# Patient Record
Sex: MALE | Race: WHITE | NOT HISPANIC OR LATINO | Employment: OTHER | URBAN - METROPOLITAN AREA
[De-identification: names, ages, dates, MRNs, and addresses within clinical notes are randomized per-mention and may not be internally consistent; named-entity substitution may affect disease eponyms.]

---

## 2017-01-11 ENCOUNTER — OFFICE VISIT (OUTPATIENT)
Dept: URGENT CARE | Facility: PHYSICIAN GROUP | Age: 77
End: 2017-01-11
Payer: MEDICARE

## 2017-01-11 VITALS
DIASTOLIC BLOOD PRESSURE: 82 MMHG | RESPIRATION RATE: 20 BRPM | TEMPERATURE: 97.4 F | BODY MASS INDEX: 25.07 KG/M2 | SYSTOLIC BLOOD PRESSURE: 116 MMHG | WEIGHT: 190 LBS | OXYGEN SATURATION: 94 % | HEART RATE: 67 BPM

## 2017-01-11 DIAGNOSIS — J20.9 ACUTE BRONCHITIS, UNSPECIFIED ORGANISM: ICD-10-CM

## 2017-01-11 PROCEDURE — 99214 OFFICE O/P EST MOD 30 MIN: CPT | Performed by: NURSE PRACTITIONER

## 2017-01-11 RX ORDER — M-VIT,TX,IRON,MINS/CALC/FOLIC 27MG-0.4MG
1 TABLET ORAL DAILY
COMMUNITY

## 2017-01-11 RX ORDER — DOXYCYCLINE HYCLATE 100 MG
100 TABLET ORAL 2 TIMES DAILY
Qty: 14 TAB | Refills: 0 | Status: SHIPPED | OUTPATIENT
Start: 2017-01-11 | End: 2017-01-17

## 2017-01-11 ASSESSMENT — ENCOUNTER SYMPTOMS
CHILLS: 0
SPUTUM PRODUCTION: 1
NAUSEA: 0
SHORTNESS OF BREATH: 0
DIARRHEA: 0
MYALGIAS: 0
FEVER: 0
VOMITING: 0
SORE THROAT: 0
COUGH: 1
WEAKNESS: 1
RHINORRHEA: 1

## 2017-01-11 ASSESSMENT — COPD QUESTIONNAIRES: COPD: 0

## 2017-01-11 NOTE — PATIENT INSTRUCTIONS
Acute Bronchitis  Bronchitis is inflammation of the airways that extend from the windpipe into the lungs (bronchi). The inflammation often causes mucus to develop. This leads to a cough, which is the most common symptom of bronchitis.   In acute bronchitis, the condition usually develops suddenly and goes away over time, usually in a couple weeks. Smoking, allergies, and asthma can make bronchitis worse. Repeated episodes of bronchitis may cause further lung problems.   CAUSES  Acute bronchitis is most often caused by the same virus that causes a cold. The virus can spread from person to person (contagious) through coughing, sneezing, and touching contaminated objects.  SIGNS AND SYMPTOMS   · Cough.    · Fever.    · Coughing up mucus.    · Body aches.    · Chest congestion.    · Chills.    · Shortness of breath.    · Sore throat.    DIAGNOSIS   Acute bronchitis is usually diagnosed through a physical exam. Your health care provider will also ask you questions about your medical history. Tests, such as chest X-rays, are sometimes done to rule out other conditions.   TREATMENT   Acute bronchitis usually goes away in a couple weeks. Oftentimes, no medical treatment is necessary. Medicines are sometimes given for relief of fever or cough. Antibiotic medicines are usually not needed but may be prescribed in certain situations. In some cases, an inhaler may be recommended to help reduce shortness of breath and control the cough. A cool mist vaporizer may also be used to help thin bronchial secretions and make it easier to clear the chest.   HOME CARE INSTRUCTIONS  · Get plenty of rest.    · Drink enough fluids to keep your urine clear or pale yellow (unless you have a medical condition that requires fluid restriction). Increasing fluids may help thin your respiratory secretions (sputum) and reduce chest congestion, and it will prevent dehydration.    · Take medicines only as directed by your health care provider.  · If  you were prescribed an antibiotic medicine, finish it all even if you start to feel better.  · Avoid smoking and secondhand smoke. Exposure to cigarette smoke or irritating chemicals will make bronchitis worse. If you are a smoker, consider using nicotine gum or skin patches to help control withdrawal symptoms. Quitting smoking will help your lungs heal faster.    · Reduce the chances of another bout of acute bronchitis by washing your hands frequently, avoiding people with cold symptoms, and trying not to touch your hands to your mouth, nose, or eyes.    · Keep all follow-up visits as directed by your health care provider.    SEEK MEDICAL CARE IF:  Your symptoms do not improve after 1 week of treatment.   SEEK IMMEDIATE MEDICAL CARE IF:  · You develop an increased fever or chills.    · You have chest pain.    · You have severe shortness of breath.  · You have bloody sputum.    · You develop dehydration.  · You faint or repeatedly feel like you are going to pass out.  · You develop repeated vomiting.  · You develop a severe headache.  MAKE SURE YOU:   · Understand these instructions.  · Will watch your condition.  · Will get help right away if you are not doing well or get worse.     This information is not intended to replace advice given to you by your health care provider. Make sure you discuss any questions you have with your health care provider.     Document Released: 01/25/2006 Document Revised: 01/08/2016 Document Reviewed: 06/10/2014  [employer] Interactive Patient Education ©2016 [employer]

## 2017-01-11 NOTE — PROGRESS NOTES
Subjective:      Isaiah Coronel is a 77 y.o. male who presents with Cough            HPI Comments: Medications, Allergies and Prior Medical Hx reviewed and updated in [facility].with patient/family today         Cough  This is a new problem. The current episode started in the past 7 days. The problem has been gradually worsening. The cough is productive of sputum. Associated symptoms include rhinorrhea. Pertinent negatives include no chills, ear congestion, ear pain, fever, myalgias, nasal congestion, sore throat or shortness of breath. He has tried nothing for the symptoms. His past medical history is significant for pneumonia. There is no history of asthma, COPD or emphysema.       Review of Systems   Constitutional: Positive for malaise/fatigue. Negative for fever and chills.   HENT: Positive for rhinorrhea. Negative for congestion, ear discharge, ear pain and sore throat.    Respiratory: Positive for cough and sputum production. Negative for shortness of breath.    Gastrointestinal: Negative for nausea, vomiting and diarrhea.   Musculoskeletal: Negative for myalgias.   Neurological: Positive for weakness.          Objective:     /82 mmHg  Pulse 67  Temp(Src) 36.3 °C (97.4 °F)  Resp 20  Wt 86.183 kg (190 lb)  SpO2 94%     Physical Exam   Constitutional: He appears well-developed and well-nourished. No distress.   HENT:   Head: Normocephalic and atraumatic.   Right Ear: Tympanic membrane and ear canal normal.   Left Ear: Tympanic membrane and ear canal normal.   Nose: No rhinorrhea.   Mouth/Throat: Uvula is midline and mucous membranes are normal. Posterior oropharyngeal erythema present. No oropharyngeal exudate or posterior oropharyngeal edema.   Eyes: Conjunctivae are normal. Pupils are equal, round, and reactive to light.   Neck: Neck supple.   Cardiovascular: Normal rate, regular rhythm and normal heart sounds.    Pulmonary/Chest: Effort normal and breath sounds normal. No respiratory distress. He has  no wheezes.   Lymphadenopathy:     He has no cervical adenopathy.   Neurological: He is alert.   Skin: Skin is warm and dry.   Psychiatric: He has a normal mood and affect. His behavior is normal.   Vitals reviewed.              Assessment/Plan:       1. Acute bronchitis, unspecified organism  doxycycline (VIBRAMYCIN) 100 MG Tab     Call coumadin clinic about doxycyclin in 1-2 days    Epic generated written imformation provided along with verbal instructions for bronchitis    Rest, Fluids, tylenol, ibuprofen, humidified air, and otc cough meds  Pt will go to the ER for worsening or changing symptoms as discussed,  Follow-up with your primary care provider or return here if not improving in 7 days   Discharge instructions discussed with pt/family who verbalize understanding and agreement with poc

## 2017-01-12 ENCOUNTER — APPOINTMENT (OUTPATIENT)
Dept: ADMISSIONS | Facility: MEDICAL CENTER | Age: 77
End: 2017-01-12
Payer: MEDICARE

## 2017-01-17 ENCOUNTER — HOSPITAL ENCOUNTER (OUTPATIENT)
Dept: RADIOLOGY | Facility: MEDICAL CENTER | Age: 77
End: 2017-01-17
Attending: INTERNAL MEDICINE | Admitting: INTERNAL MEDICINE
Payer: MEDICARE

## 2017-01-17 DIAGNOSIS — Z01.810 PRE-OPERATIVE CARDIOVASCULAR EXAMINATION: ICD-10-CM

## 2017-01-17 DIAGNOSIS — Z01.811 PRE-OPERATIVE RESPIRATORY EXAMINATION: ICD-10-CM

## 2017-01-17 DIAGNOSIS — Z01.812 PRE-OPERATIVE LABORATORY EXAMINATION: ICD-10-CM

## 2017-01-17 LAB
ANION GAP SERPL CALC-SCNC: 10 MMOL/L (ref 0–11.9)
APTT PPP: 37.6 SEC (ref 24.7–36)
BASOPHILS # BLD AUTO: 0.4 % (ref 0–1.8)
BASOPHILS # BLD: 0.03 K/UL (ref 0–0.12)
BUN SERPL-MCNC: 23 MG/DL (ref 8–22)
CALCIUM SERPL-MCNC: 9 MG/DL (ref 8.4–10.2)
CHLORIDE SERPL-SCNC: 101 MMOL/L (ref 96–112)
CO2 SERPL-SCNC: 26 MMOL/L (ref 20–33)
CREAT SERPL-MCNC: 0.96 MG/DL (ref 0.5–1.4)
EKG IMPRESSION: NORMAL
EOSINOPHIL # BLD AUTO: 0.09 K/UL (ref 0–0.51)
EOSINOPHIL NFR BLD: 1.3 % (ref 0–6.9)
ERYTHROCYTE [DISTWIDTH] IN BLOOD BY AUTOMATED COUNT: 46.5 FL (ref 35.9–50)
GFR SERPL CREATININE-BSD FRML MDRD: >60 ML/MIN/1.73 M 2
GLUCOSE SERPL-MCNC: 112 MG/DL (ref 65–99)
HCT VFR BLD AUTO: 47.7 % (ref 42–52)
HGB BLD-MCNC: 16.2 G/DL (ref 14–18)
IMM GRANULOCYTES # BLD AUTO: 0.02 K/UL (ref 0–0.11)
IMM GRANULOCYTES NFR BLD AUTO: 0.3 % (ref 0–0.9)
INR PPP: 2.9 (ref 0.87–1.13)
LYMPHOCYTES # BLD AUTO: 1.42 K/UL (ref 1–4.8)
LYMPHOCYTES NFR BLD: 19.9 % (ref 22–41)
MCH RBC QN AUTO: 32.9 PG (ref 27–33)
MCHC RBC AUTO-ENTMCNC: 34 G/DL (ref 33.7–35.3)
MCV RBC AUTO: 97 FL (ref 81.4–97.8)
MONOCYTES # BLD AUTO: 0.56 K/UL (ref 0–0.85)
MONOCYTES NFR BLD AUTO: 7.9 % (ref 0–13.4)
NEUTROPHILS # BLD AUTO: 5.01 K/UL (ref 1.82–7.42)
NEUTROPHILS NFR BLD: 70.2 % (ref 44–72)
NRBC # BLD AUTO: 0 K/UL
NRBC BLD AUTO-RTO: 0 /100 WBC
PLATELET # BLD AUTO: 174 K/UL (ref 164–446)
PMV BLD AUTO: 10.4 FL (ref 9–12.9)
POTASSIUM SERPL-SCNC: 4.1 MMOL/L (ref 3.6–5.5)
PROTHROMBIN TIME: 30.1 SEC (ref 12–14.6)
RBC # BLD AUTO: 4.92 M/UL (ref 4.7–6.1)
SODIUM SERPL-SCNC: 137 MMOL/L (ref 135–145)
WBC # BLD AUTO: 7.1 K/UL (ref 4.8–10.8)

## 2017-01-17 PROCEDURE — 85610 PROTHROMBIN TIME: CPT

## 2017-01-17 PROCEDURE — 80048 BASIC METABOLIC PNL TOTAL CA: CPT

## 2017-01-17 PROCEDURE — 71010 DX-CHEST-LIMITED (1 VIEW): CPT

## 2017-01-17 PROCEDURE — 85730 THROMBOPLASTIN TIME PARTIAL: CPT

## 2017-01-17 PROCEDURE — 85025 COMPLETE CBC W/AUTO DIFF WBC: CPT

## 2017-01-17 PROCEDURE — 36415 COLL VENOUS BLD VENIPUNCTURE: CPT

## 2017-01-17 NOTE — OR NURSING
Pre admit appt: Pt instructed to continue regularly prescribed medications through day before surgery.Per anesthesia protocol pt instructed to take these medications with a sip of water the day of surgery- metoprolol, finasteride and prilosec. Pt going this week to coumadin clinic and will find out when to stop coumadin and if bridging ,also when to stop ASA. Aware to stop vitamins/supplements now.

## 2017-01-18 ENCOUNTER — ANTICOAGULATION VISIT (OUTPATIENT)
Dept: VASCULAR LAB | Facility: MEDICAL CENTER | Age: 77
End: 2017-01-18
Attending: NURSE PRACTITIONER
Payer: MEDICARE

## 2017-01-18 DIAGNOSIS — I48.20 CHRONIC ATRIAL FIBRILLATION (HCC): ICD-10-CM

## 2017-01-18 LAB
INR BLD: 3.2 (ref 0.9–1.2)
INR PPP: 3.2 (ref 2–3.5)

## 2017-01-18 PROCEDURE — 99212 OFFICE O/P EST SF 10 MIN: CPT

## 2017-01-18 PROCEDURE — 85610 PROTHROMBIN TIME: CPT

## 2017-01-18 NOTE — MR AVS SNAPSHOT
"        Isaiah Coronel   2017 9:15 AM   Anticoagulation Visit   MRN: [id number]    Department:  Vascular Medicine   Dept Phone:  [phone]    Description:  Male : 1940   Provider:  [facility] EXAM 1           Allergies as of 2017     Allergen Noted Reactions    Other Environmental 2012       \"My nose and eyes are bothered when I walk outside.\"      You were diagnosed with     Chronic atrial fibrillation (CMS-HCC)   [408360]         Vital Signs     Smoking Status                   Former Smoker           Basic Information     Date Of Birth Sex Race Ethnicity Preferred Language    1940 Male White Non- English      Your appointments     2017  9:15 AM   Established Patient with [facility] EXAM 1   [facility]  (--)    [address] [place] [address]   [phone]            2017  9:15 AM   Established Patient with [facility] EXAM 5   [facility]  (--)    [address] [place] [address]   [phone]            2017  9:30 AM   Anti-Coag Routine with [place] PHARMACIST   [facility]    [address] [place] [id number] [phone]            Mar 20, 2017 10:00 AM   ANNUAL EXAM PREVENTATIVE with OSCAR Hutchinson   [facility])    [address] [address]   [phone]            2017 10:40 AM   Established Patient with Avery Nelson M.D.   [facility] [address] [place] [id number] [phone]           You will be receiving a confirmation call a few days before your appointment from our automated call confirmation system.              Problem List              ICD-10-CM Priority Class Noted - Resolved    Essential hypertension I10   2011 - Present    COPD (chronic obstructive pulmonary disease) " (CMS-HCC) J44.9   3/9/2012 - Present    S/P colonoscopy 2013; NIA 5 YR- at [facility] Z98.890   3/9/2012 - Present    Left ventricular systolic dysfunction-EF=45-50% ECHO 2012- normal cardiac cath 2014- dr khan at [facility] I51.9   6/19/2012 - Present    Mixed hyperlipidemia E78.2   6/19/2012 - Present    Vitamin D deficiency disease E55.9   12/2/2013 - Present    Achalasia of esophagus- fundoplication at [facility] 2014- [facility] K22.0   2/26/2014 - Present    Gastroesophageal reflux disease with esophagitis- fundoplication at [facility] 2014 K21.0   4/9/2015 - Present    BPH (benign prostatic hyperplasia)- neg cysto 2014 N40.0   4/9/2015 - Present    Atrial fibrillation (HCC)- dr khan I48.91   10/14/2015 - Present    Anticoagulated on warfarin- a fib, dr rhodes Z79.01   11/24/2015 - Present    Right hip pain- dr hughes M25.551   11/24/2015 - Present    Chronic low back pain- dr hughes M54.5, G89.29   5/27/2016 - Present    Moderate episode of recurrent major depressive disorder (CMS-HCC) F33.1   12/1/2016 - Present      Health Maintenance        Date Due Completion Dates    IMM DTaP/Tdap/Td Vaccine (1 - Tdap) 1/11/1960 ---    IMM ZOSTER VACCINE 1/11/2001 ---    IMM PNEUMOCOCCAL 65+ (ADULT) LOW/MEDIUM RISK SERIES (2 of 2 - PPSV23) 1/14/2016 1/14/2015    COLONOSCOPY 1/29/2018 1/29/2013            Results     POCT Protime      Component    INR    3.2                        Current Immunizations     13-VALENT PCV PREVNAR 1/14/2015    Influenza TIV (IM) 9/1/2012    Influenza Vaccine 9/1/2011, 10/1/2010    Influenza Vaccine Adult HD 9/15/2016    Influenza Vaccine Quad Inj (Preserved) 10/17/2014    Pneumococcal Vaccine (UF)Historical Data 10/1/2010      Below and/or attached are the medications your provider expects you to take. Review all of your home medications and newly ordered medications with your provider and/or pharmacist. Follow medication instructions as directed by your provider and/or pharmacist. Please keep your medication list with  you and share with your provider. Update the information when medications are discontinued, doses are changed, or new medications (including over-the-counter products) are added; and carry medication information at all times in the event of emergency situations     Allergies:  OTHER ENVIRONMENTAL - (reactions not documented)               Medications  Valid as of: January 18, 2017 -  8:30 AM    Generic Name Brand Name Tablet Size Instructions for use    Aspirin (Chew Tab) ASA 81 MG Take 1 Tab by mouth every day.        Cholecalciferol (Cap) Vitamin D 2000 UNITS Take  by mouth.        Citalopram Hydrobromide (Tab) CELEXA 10 MG Take 1 Tab by mouth every day.        Cyanocobalamin (Tab) VITAMIN B-12 500 MCG Take 500 mcg by mouth 2 Times a Day.        Finasteride (Tab) PROSCAR 5 MG Take 1 Tab by mouth every day.        Lisinopril (Tab) PRINIVIL 20 MG Take 1 tablet by mouth  every day        Metoprolol Succinate (TABLET SR 24 HR) TOPROL XL 50 MG Take 1 Tab by mouth every day. Dr khan to refill        Multiple Vitamins-Minerals (Tab) THERAGRAN-M  Take 1 Tab by mouth every day.        Omega-3 Fatty Acids (Cap) OMEGA 3 FA 1000 MG Take 1 Cap by mouth 3 times a day, with meals.        Omeprazole (CAPSULE DELAYED RELEASE) PRILOSEC 20 MG Take 1 capsule by mouth  twice daily        Pravastatin Sodium (Tab) PRAVACHOL 40 MG Take 1 tablet by mouth  daily        Warfarin Sodium (Tab) COUMADIN 5 MG TAKE ONE AND ONE-HALF  TABLETS DAILY AS DIRECTED  BY COUMADIN CLINIC        .                 Medicines prescribed today were sent to:     [facility] - [address] [facility] [address]    Phone: [phone] Fax: [phone]    Open 24 Hours?: No    [facility] - [place] - [address]    [address] [place] [address]    Phone: [phone] Fax: [phone]    Open 24 Hours?: No    [id number] - [place] - [address]    [address]  [place] [address]    Phone: [phone] Fax: [phone]    Open 24 Hours?: No      Medication refill instructions:       If your prescription bottle indicates you have medication refills left, it is not necessary to call your provider’s office. Please contact your pharmacy and they will refill your medication.    If your prescription bottle indicates you do not have any refills left, you may request refills at any time through one of the following ways: The online [employer] system (except Urgent Care), by calling your provider’s office, or by asking your pharmacy to contact your provider’s office with a refill request. Medication refills are processed only during regular business hours and may not be available until the next business day. Your provider may request additional information or to have a follow-up visit with you prior to refilling your medication.   *Please Note: Medication refills are assigned a new Rx number when refilled electronically. Your pharmacy may indicate that no refills were authorized even though a new prescription for the same medication is available at the pharmacy. Please request the medicine by name with the pharmacy before contacting your provider for a refill.        Warfarin Dosing Calendar   January 2017 Details    Sun Mon Tue Wed Thu Fri Sat     1               2               3               4               5               6               7                 8               9               10               11               12               13               14                 15               16               17               18   3.2   5 mg   See details      19      5 mg         20      Hold         21      Hold           22      Hold         23      Hold         24      Hold         25      7.5 mg         26      5 mg         27      7.5 mg         28      7.5 mg           29      7.5 mg         30      7.5 mg         31      5 mg              Date Details   01/18 This INR check    INR: 3.2      Date of next INR: No date specified         How to take your warfarin dose     To take:  5 mg Take 1 of the 5 mg tablets.    To take:  7.5 mg Take 1.5 of the 5 mg tablets.    Hold Do not take your warfarin dose. See the Details table to the right for additional instructions.                   MyChart Status: Patient Declined

## 2017-01-19 NOTE — OR NURSING
Spoke to Jessica Carreon's office regarding abnormal EKG, showing previous infart.  She is contacting patient's cardiologist, to get a cardiac clearance.  She will follow up Monday 1/23/17 to see if patient is cleared.  If not, Dr. Carreon's office will cxl until get a clearance

## 2017-01-24 ENCOUNTER — ANTICOAGULATION VISIT (OUTPATIENT)
Dept: VASCULAR LAB | Facility: MEDICAL CENTER | Age: 77
End: 2017-01-24
Attending: NURSE PRACTITIONER
Payer: MEDICARE

## 2017-01-24 ENCOUNTER — TELEPHONE (OUTPATIENT)
Dept: VASCULAR LAB | Facility: MEDICAL CENTER | Age: 77
End: 2017-01-24

## 2017-01-24 DIAGNOSIS — I48.20 CHRONIC ATRIAL FIBRILLATION (HCC): ICD-10-CM

## 2017-01-24 LAB — INR PPP: 1.3 (ref 2–3.5)

## 2017-01-24 PROCEDURE — 99211 OFF/OP EST MAY X REQ PHY/QHP: CPT | Performed by: NURSE PRACTITIONER

## 2017-01-24 PROCEDURE — 85610 PROTHROMBIN TIME: CPT

## 2017-01-24 NOTE — TELEPHONE ENCOUNTER
INR report faxed to Dr[facility] at [phone]    MARIA DOLORES Garcia  [facility]

## 2017-01-24 NOTE — MR AVS SNAPSHOT
"        Isaiah Coronel   2017 9:15 AM   Anticoagulation Visit   MRN: [id number]    Department:  Vascular Medicine   Dept Phone:  [phone]    Description:  Male : 1940   Provider:  [facility] EXAM 5           Allergies as of 2017     Allergen Noted Reactions    Other Environmental 2012       \"My nose and eyes are bothered when I walk outside.\"      You were diagnosed with     Chronic atrial fibrillation (CMS-HCC)   [172014]         Vital Signs     Smoking Status                   Former Smoker           Basic Information     Date Of Birth Sex Race Ethnicity Preferred Language    1940 Male White Non- English      Your appointments     2017  9:15 AM   Established Patient with [facility] EXAM 5   [facility]  (--)    [address] [address]   [phone]            2017  9:00 AM   Anti-Coag Routine with [place] PHARMACIST   [facility])    [address] [place] [phone]            Mar 20, 2017 10:00 AM   ANNUAL EXAM PREVENTATIVE with OSCAR Hutchinson   [facility])    [address] [place] [phone]            2017 10:40 AM   Established Patient with Avery Nelson M.D.   [address]    [address] [facility] [address] [phone]           You will be receiving a confirmation call a few days before your appointment from our automated call confirmation system.              Problem List              ICD-10-CM Priority Class Noted - Resolved    Essential hypertension I10   2011 - Present    COPD (chronic obstructive pulmonary disease) (CMS-HCC) J44.9   3/9/2012 - Present    S/P colonoscopy ; NIA 5 YR- at [facility] Z98.890   3/9/2012 - Present    Left ventricular systolic dysfunction-EF=45-50% ECHO - normal cardiac cath - dr" erin at [facility] I51.9   6/19/2012 - Present    Mixed hyperlipidemia E78.2   6/19/2012 - Present    Vitamin D deficiency disease E55.9   12/2/2013 - Present    Achalasia of esophagus- fundoplication at [facility] 2014- [facility] K22.0   2/26/2014 - Present    Gastroesophageal reflux disease with esophagitis- fundoplication at [facility] 2014 K21.0   4/9/2015 - Present    BPH (benign prostatic hyperplasia)- neg cysto 2014 N40.0   4/9/2015 - Present    Atrial fibrillation (HCC)- dr khan I48.91   10/14/2015 - Present    Anticoagulated on warfarin- a fib, dr rhodes Z79.01   11/24/2015 - Present    Right hip pain- dr hughes M25.551   11/24/2015 - Present    Chronic low back pain- dr hughes M54.5, G89.29   5/27/2016 - Present    Moderate episode of recurrent major depressive disorder (CMS-HCC) F33.1   12/1/2016 - Present      Health Maintenance        Date Due Completion Dates    IMM DTaP/Tdap/Td Vaccine (1 - Tdap) 1/11/1960 ---    IMM ZOSTER VACCINE 1/11/2001 ---    IMM PNEUMOCOCCAL 65+ (ADULT) LOW/MEDIUM RISK SERIES (2 of 2 - PPSV23) 1/14/2016 1/14/2015    COLONOSCOPY 8/31/2020 8/31/2015, 1/29/2013            Results     POCT Protime      Component    INR    1.3                        Current Immunizations     13-VALENT PCV PREVNAR 1/14/2015    Influenza TIV (IM) 9/1/2012    Influenza Vaccine 9/1/2011, 10/1/2010    Influenza Vaccine Adult HD 9/15/2016    Influenza Vaccine Quad Inj (Preserved) 10/17/2014    Pneumococcal Vaccine (UF)Historical Data 10/1/2010      Below and/or attached are the medications your provider expects you to take. Review all of your home medications and newly ordered medications with your provider and/or pharmacist. Follow medication instructions as directed by your provider and/or pharmacist. Please keep your medication list with you and share with your provider. Update the information when medications are discontinued, doses are changed, or new medications (including over-the-counter products) are added; and carry  medication information at all times in the event of emergency situations     Allergies:  OTHER ENVIRONMENTAL - (reactions not documented)               Medications  Valid as of: January 24, 2017 -  8:28 AM    Generic Name Brand Name Tablet Size Instructions for use    Aspirin (Chew Tab) ASA 81 MG Take 1 Tab by mouth every day.        Cholecalciferol (Cap) Vitamin D 2000 UNITS Take  by mouth.        Citalopram Hydrobromide (Tab) CELEXA 10 MG Take 1 Tab by mouth every day.        Cyanocobalamin (Tab) VITAMIN B-12 500 MCG Take 500 mcg by mouth 2 Times a Day.        Finasteride (Tab) PROSCAR 5 MG Take 1 Tab by mouth every day.        Lisinopril (Tab) PRINIVIL 20 MG Take 1 tablet by mouth  every day        Metoprolol Succinate (TABLET SR 24 HR) TOPROL XL 50 MG Take 1 Tab by mouth every day. Dr khan to refill        Multiple Vitamins-Minerals (Tab) THERAGRAN-M  Take 1 Tab by mouth every day.        Omega-3 Fatty Acids (Cap) OMEGA 3 FA 1000 MG Take 1 Cap by mouth 3 times a day, with meals.        Omeprazole (CAPSULE DELAYED RELEASE) PRILOSEC 20 MG Take 1 capsule by mouth  twice daily        Pravastatin Sodium (Tab) PRAVACHOL 40 MG Take 1 tablet by mouth  daily        Warfarin Sodium (Tab) COUMADIN 5 MG TAKE ONE AND ONE-HALF  TABLETS DAILY AS DIRECTED  BY COUMADIN CLINIC        .                 Medicines prescribed today were sent to:     [facility] - [address] [facility] [address]    Phone: [phone] Fax: [phone]    Open 24 Hours?: No    [facility] [place] - [address]    [facility] [facility] [place] [address]    Phone: [phone] Fax: [phone]    Open 24 Hours?: No    [id number] - [place] - [address]    [address] [place] [address]    Phone: [phone] Fax: [phone]    Open 24 Hours?: No      Medication refill instructions:       If your prescription bottle indicates you have medication  refills left, it is not necessary to call your provider’s office. Please contact your pharmacy and they will refill your medication.    If your prescription bottle indicates you do not have any refills left, you may request refills at any time through one of the following ways: The online [employer] system (except Urgent Care), by calling your provider’s office, or by asking your pharmacy to contact your provider’s office with a refill request. Medication refills are processed only during regular business hours and may not be available until the next business day. Your provider may request additional information or to have a follow-up visit with you prior to refilling your medication.   *Please Note: Medication refills are assigned a new Rx number when refilled electronically. Your pharmacy may indicate that no refills were authorized even though a new prescription for the same medication is available at the pharmacy. Please request the medicine by name with the pharmacy before contacting your provider for a refill.        Warfarin Dosing Calendar   January 2017 Details    Sun Mon Tue Wed Thu Fri Sat     1               2               3               4               5               6               7                 8               9               10               11               12               13               14                 15               16               17               18               19               20               21                 22               23               24   1.3   Hold   See details      25      7.5 mg         26      5 mg         27      7.5 mg         28      7.5 mg           29      7.5 mg         30      7.5 mg         31                    Date Details   01/24 This INR check   INR: 1.3       Date of next INR:  1/30/2017         How to take your warfarin dose     To take:  5 mg Take 1 of the 5 mg tablets.    To take:  7.5 mg Take 1.5 of the 5 mg tablets.    Hold Do not take your  warfarin dose. See the Details table to the right for additional instructions.                   MyChart Status: Patient Declined

## 2017-01-24 NOTE — PROGRESS NOTES
OP Anticoagulation Service Note    Date: 1/24/2017       Plan:  Pt is subtherapeutic, as he is holding his warfarin for an upcoming procedure.  Follow up appointment in 1 week(s).  He anticipates restarting warfarin 1/25, the night of his procedure.      Anticoagulation Summary as of 1/24/2017     INR goal 2.0-3.0   Selected INR 1.3! (1/24/2017)   Maintenance plan 5 mg (5 mg x 1) on Tue, Thu; 7.5 mg (5 mg x 1.5) all other days   Weekly total 47.5 mg   Plan last modified OSCAR Villalta (6/23/2016)   Next INR check 1/30/2017   Target end date Indefinite    Indications   A-fib- chronic warfarin rx (Resolved) [I48.91]  Atrial fibrillation (HCC)- dr khan [I48.91]         Anticoagulation Episode Summary     INR check location [facility]    Preferred lab     Send INR reminders to     Comments       Anticoagulation Care Providers     Provider Role Specialty Phone number    Duncan Tierney M.D. Referring Family Medicine [phone]    Alonso Khan M.D.  Cardiology [phone]    Mikhail Camacho, PHARMD Responsible            [facility]  [facility]

## 2017-01-25 ENCOUNTER — APPOINTMENT (OUTPATIENT)
Dept: RADIOLOGY | Facility: MEDICAL CENTER | Age: 77
End: 2017-01-25
Attending: INTERNAL MEDICINE
Payer: MEDICARE

## 2017-01-25 ENCOUNTER — HOSPITAL ENCOUNTER (OUTPATIENT)
Facility: MEDICAL CENTER | Age: 77
End: 2017-01-25
Attending: INTERNAL MEDICINE | Admitting: INTERNAL MEDICINE
Payer: MEDICARE

## 2017-01-25 VITALS
WEIGHT: 190.17 LBS | RESPIRATION RATE: 16 BRPM | BODY MASS INDEX: 24.41 KG/M2 | HEART RATE: 59 BPM | DIASTOLIC BLOOD PRESSURE: 101 MMHG | OXYGEN SATURATION: 92 % | HEIGHT: 74 IN | TEMPERATURE: 97.9 F | SYSTOLIC BLOOD PRESSURE: 160 MMHG

## 2017-01-25 PROBLEM — K80.51 CHOLEDOCHOLITHIASIS WITH OBSTRUCTION: Status: ACTIVE | Noted: 2017-01-25

## 2017-01-25 PROBLEM — R10.9 ABDOMINAL PAIN: Status: ACTIVE | Noted: 2017-01-25

## 2017-01-25 PROBLEM — R93.2 BILIARY TRACT IMAGING ABNORMALITY: Status: ACTIVE | Noted: 2017-01-25

## 2017-01-25 LAB
INR BLD: 1.3 (ref 0.9–1.2)
INR PPP: 1.05 (ref 0.87–1.13)
PATHOLOGY CONSULT NOTE: NORMAL
PROTHROMBIN TIME: 13.5 SEC (ref 12–14.6)

## 2017-01-25 PROCEDURE — 700111 HCHG RX REV CODE 636 W/ 250 OVERRIDE (IP)

## 2017-01-25 PROCEDURE — A9270 NON-COVERED ITEM OR SERVICE: HCPCS

## 2017-01-25 PROCEDURE — 88312 SPECIAL STAINS GROUP 1: CPT

## 2017-01-25 PROCEDURE — 88305 TISSUE EXAM BY PATHOLOGIST: CPT

## 2017-01-25 PROCEDURE — 160046 HCHG PACU - 1ST 60 MINS PHASE II: Performed by: INTERNAL MEDICINE

## 2017-01-25 PROCEDURE — 160036 HCHG PACU - EA ADDL 30 MINS PHASE I: Performed by: INTERNAL MEDICINE

## 2017-01-25 PROCEDURE — 700102 HCHG RX REV CODE 250 W/ 637 OVERRIDE(OP)

## 2017-01-25 PROCEDURE — 700101 HCHG RX REV CODE 250

## 2017-01-25 PROCEDURE — 160208 HCHG ENDO MINUTES - EA ADDL 1 MIN LEVEL 4: Performed by: INTERNAL MEDICINE

## 2017-01-25 PROCEDURE — 160009 HCHG ANES TIME/MIN: Performed by: INTERNAL MEDICINE

## 2017-01-25 PROCEDURE — 74328 X-RAY BILE DUCT ENDOSCOPY: CPT

## 2017-01-25 PROCEDURE — 160025 RECOVERY II MINUTES (STATS): Performed by: INTERNAL MEDICINE

## 2017-01-25 PROCEDURE — 500066 HCHG BITE BLOCK, ECT: Performed by: INTERNAL MEDICINE

## 2017-01-25 PROCEDURE — 160048 HCHG OR STATISTICAL LEVEL 1-5: Performed by: INTERNAL MEDICINE

## 2017-01-25 PROCEDURE — 160002 HCHG RECOVERY MINUTES (STAT): Performed by: INTERNAL MEDICINE

## 2017-01-25 PROCEDURE — 502240 HCHG MISC OR SUPPLY RC 0272: Performed by: INTERNAL MEDICINE

## 2017-01-25 PROCEDURE — 110371 HCHG SHELL REV 272: Performed by: INTERNAL MEDICINE

## 2017-01-25 PROCEDURE — 160035 HCHG PACU - 1ST 60 MINS PHASE I: Performed by: INTERNAL MEDICINE

## 2017-01-25 PROCEDURE — 36415 COLL VENOUS BLD VENIPUNCTURE: CPT

## 2017-01-25 PROCEDURE — 85610 PROTHROMBIN TIME: CPT

## 2017-01-25 PROCEDURE — A4606 OXYGEN PROBE USED W OXIMETER: HCPCS | Performed by: INTERNAL MEDICINE

## 2017-01-25 PROCEDURE — 160047 HCHG PACU  - EA ADDL 30 MINS PHASE II: Performed by: INTERNAL MEDICINE

## 2017-01-25 PROCEDURE — 700117 HCHG RX CONTRAST REV CODE 255

## 2017-01-25 PROCEDURE — 160203 HCHG ENDO MINUTES - 1ST 30 MINS LEVEL 4: Performed by: INTERNAL MEDICINE

## 2017-01-25 RX ORDER — SODIUM CHLORIDE, SODIUM LACTATE, POTASSIUM CHLORIDE, CALCIUM CHLORIDE 600; 310; 30; 20 MG/100ML; MG/100ML; MG/100ML; MG/100ML
1000 INJECTION, SOLUTION INTRAVENOUS
Status: COMPLETED | OUTPATIENT
Start: 2017-01-25 | End: 2017-01-25

## 2017-01-25 RX ADMIN — SODIUM CHLORIDE, SODIUM LACTATE, POTASSIUM CHLORIDE, CALCIUM CHLORIDE 1000 ML: 600; 310; 30; 20 INJECTION, SOLUTION INTRAVENOUS at 07:15

## 2017-01-25 ASSESSMENT — PAIN SCALES - GENERAL
PAINLEVEL_OUTOF10: 0

## 2017-01-25 NOTE — IP AVS SNAPSHOT
1/25/2017          Isaiah Coronel  [address] [place] [address]    Dear Isaiah:    [facility] wants to ensure your discharge home is safe and you or your loved ones have had all your questions answered regarding your care after you leave the hospital.    You may receive a telephone call within two days of your discharge.  This call is to make certain you understand your discharge instructions as well as ensure we provided you with the best care possible during your stay with us.     The call will only last approximately 3-5 minutes and will be done by a nurse.    Once again, we want to ensure your discharge home is safe and that you have a clear understanding of any next steps in your care.  If you have any questions or concerns, please do not hesitate to contact us, we are here for you.  Thank you for choosing [facility] for your healthcare needs.    Sincerely,    Michi Tovar    [facility]

## 2017-01-25 NOTE — OR NURSING
1012: Settled in recliner post short ambulation from [facility]. Pt aware of plan to continue to monitor oxygenation and ensure stable on RA prior to d/c home.  1015: Sitting up, awake/alert, O2 d/socrates  1030: Tolerates RA, meets criteria to d/c home.  1037: D/Socrates to care of family post uneventful stay in PACU 2.

## 2017-01-25 NOTE — OR NURSING
0901: To PACU from EUS via gurney, sleeping, respirations spontaneous and non-labored via ETT. Abdo soft. Plan to keep pt in PACU for full hour per STOPBANG protocol.  0904: extubated by Dr Potts, pt denies pain and nausea. O2 weaning commenced.   0912: s/b Dr Carreon  0921: commenced po water  0930: O2 d/socrates  0940: SpO2 drops to 80's, O2 resumed.  0945: DB&C, tolerating po water.   1001: Meets criteria to transfer to Stage 2 w/O2.

## 2017-01-25 NOTE — IP AVS SNAPSHOT
" After Visit Summary                                                                                                                Name:Isaiah Coronel  Medical Record Number:[id number]  CSN: [id number]    YOB: 1940   Age: 77 y.o.  Sex: male  HT:1.88 m (6' 2.02\") WT: 86.26 kg (190 lb 2.7 oz)          Admit Date: 1/25/2017     Discharge Date:   Today's Date: 1/25/2017  Attending Doctor:  Bradley Carreon M.D.                  Allergies:  Other environmental                Discharge Instructions       ENDOSCOPY HOME CARE INSTRUCTIONS    GASTROSCOPY OR ERCP  1. Don't eat or drink anything for about an hour after the test. You can then resume your regular diet.  2. Don't drive or drink alcohol for 24 hours. The medication you received will make you too drowsy.  3. Don't take any coffee, tea, or aspirin products until after you see your doctor. These can harm the lining of your stomach.  4. If you begin to vomit bloody material, or develop black or bloody stools, call your doctor as soon as possible.  5. If you have any neck, chest, abdominal pain or temp of 100 degrees, call your doctor.  6. See your doctor [facility] office will call for follow-up with Dr. Luciano  7. Additional instructions: No alcohol or sleeping pills today.   Patient not to drive, operate heavy machinery or make important decisions for 24 hours.    Hold aspirin for 5 days, okay to restart coumadin tonight.     Results to convey to patient: common bile duct stones extracted after biliary sphincterotomy (opening for tubes draining liver were widened or opened for removal of stones).    You should call 911 if you develop problems with breathing or chest pain.  If any questions arise, call your doctor. If your doctor is not available, please feel free to call [phone]. You can also call the [place] HOTLINE open 24 hours/day, 7 days/week and speak to a nurse at [phone], or toll free [phone].    Depression / Suicide Risk    As " you are discharged from this [facility] facility, it is important to learn how to keep safe from harming yourself.    Recognize the warning signs:  · Abrupt changes in personality, positive or negative- including increase in energy   · Giving away possessions  · Change in eating patterns- significant weight changes-  positive or negative  · Change in sleeping patterns- unable to sleep or sleeping all the time   · Unwillingness or inability to communicate  · Depression  · Unusual sadness, discouragement and loneliness  · Talk of wanting to die  · Neglect of personal appearance   · Rebelliousness- reckless behavior  · Withdrawal from people/activities they love  · Confusion- inability to concentrate     If you or a loved one observes any of these behaviors or has concerns about self-harm, here's what you can do:  · Talk about it- your feelings and reasons for harming yourself  · Remove any means that you might use to hurt yourself (examples: pills, rope, extension cords, firearm)  · Get professional help from the community (Mental Health, Substance Abuse, psychological counseling)  · Do not be alone:Call your Safe Contact- someone whom you trust who will be there for you.  · Call your local CRISIS HOTLINE 114-7680 or 198-247-8479  · Call your local Children's Mobile Crisis Response Team [place] (808) 186-0896 or www.[employer]  · Call the toll free National Suicide Prevention Hotlines   · National Suicide Prevention Lifeline 568-187-XJYG (2300)  · National Hope Line Network 800-SUICIDE (740-8675)    I acknowledge receipt and understanding of these Home Care Instructions.    Discharge Education for patients on ROBERT (Obstructive Sleep Apnea) Protocol    Prior to receiving sedation or anesthesia, we screen all patients for Obstructive Sleep Apnea.  During your screening, you were identified as having suspected, but not confirmed Obstructive Sleep Apnea(ROBERT).    What is Obstructive Sleep Apnea?  Sleep apnea  (AP-ne-ah) is a common disorder which involves breathing pauses that occur during sleep.  These can last from 10 seconds to a minute or longer.  Normal breathing resumes often with a loud snort or choking sound.    Sleep apnea occurs in all age groups and both genders but is more common in men and people over 40 years of age.  It has been estimated that as many as 18 million Americans have sleep apnea.  Most people who have sleep apnea don’t know they have it because it only occurs during sleep.  A family member and/or bed partner may first notice the signs of sleep apnea.  Sleep apnea is a chronic (ongoing) condition that disrupts the quality and quantity of your sleep repeatedly throughout the night.  This often results in excessive daytime sleepiness or fatigue during the day.  It may also contribute to high blood pressure, heart problems, and complications following medications used for surgery and procedures.    To establish a definitive diagnosis, further testing from a specialist would be needed.  We recommend that you follow up with your primary care physician.    We recommend that you should be with an adult observer for at least 24 hours after your sedation/anesthesia.  If you have a CPAP machine, you should wear it during any sleep period (day or night) for the week following your procedure.  We encourage you to sleep on your side or in a sitting position, even with napping.  Lying flat on your back increases the risk of apnea and airway obstruction during your post procedure recovery period.    It is important to prevent over-sedation that could increase your risk for apnea.  Please take all pain medication as directed by your physician.  If you are not getting pain relief, please contact your physician to discuss possible approaches to relieving pain while minimizing medications that can affect your breathing and oxygen levels.             Medication List      CONTINUE taking these medications         Instructions    aspirin 81 MG Chew chewable tablet   Commonly known as:  ASA    Take 1 Tab by mouth every day.   Dose:  81 mg       citalopram 10 MG tablet   Commonly known as:  CELEXA    Take 1 Tab by mouth every day.   Dose:  10 mg       cyanocobalamin 500 MCG Tabs   Commonly known as:  VITAMIN B-12    Take 500 mcg by mouth 2 Times a Day.   Dose:  500 mcg       docosahexanoic acid 1000 MG Caps   Commonly known as:  OMEGA 3 FA    Take 1 Cap by mouth 3 times a day, with meals.   Dose:  1000 mg       finasteride 5 MG Tabs   Commonly known as:  PROSCAR    Take 1 Tab by mouth every day.   Dose:  5 mg       lisinopril 20 MG Tabs   Commonly known as:  PRINIVIL    Take 1 tablet by mouth  every day       metoprolol SR 50 MG Tb24   Commonly known as:  TOPROL XL    Doctor's comments:  Dr khan to refill   Take 1 Tab by mouth every day. Dr khan to refill   Dose:  50 mg       omeprazole 20 MG delayed-release capsule   Commonly known as:  PRILOSEC    Take 1 capsule by mouth  twice daily       pravastatin 40 MG tablet   Commonly known as:  PRAVACHOL    Take 1 tablet by mouth  daily       therapeutic multivitamin-minerals Tabs    Take 1 Tab by mouth every day.   Dose:  1 Tab       Vitamin D 2000 UNITS Caps    Take  by mouth.       warfarin 5 MG Tabs   Commonly known as:  COUMADIN    TAKE ONE AND ONE-HALF  TABLETS DAILY AS DIRECTED  BY COUMADIN CLINIC               Medication Information     Above and/or attached are the medications your physician expects you to take upon discharge. Review all of your home medications and newly ordered medications with your doctor and/or pharmacist. Follow medication instructions as directed by your doctor and/or pharmacist. Please keep your medication list with you and share with your physician. Update the information when medications are discontinued, doses are changed, or new medications (including over-the-counter products) are added; and carry medication information at all times in the  event of emergency situations.        Resources     Quit Smoking / Tobacco Use:    I understand the use of any tobacco products increases my chance of suffering from future heart disease or stroke and could cause other illnesses which may shorten my life. Quitting the use of tobacco products is the single most important thing I can do to improve my health. For further information on smoking / tobacco cessation call a Toll Free Quit Line at 1-727.919.6651 (*National Cancer [place]) or 1-714.764.6602 (American Lung Association) or you can access the web based program at www.lungusa.org.    [place] Tobacco Users Help Line:  (184) 287-5768       Toll Free: 1-239.476.4796  Quit Tobacco Program [facility] (452)044-8498    Crisis Hotline:    [place] Crisis Hotline:  4-334-MBKVROF or 1-511.465.3243    [place] Crisis Hotline:    1-854.262.2744 or 736-214-7153    Discharge Survey:   Thank you for choosing [facility]. We hope we did everything we could to make your hospital stay a pleasant one. You may be receiving a survey and we would appreciate your time and participation in answering the questions. Your input is very valuable to us in our efforts to improve our service to our patients and their families.            Signatures     My signature on this form indicates that:    1. I acknowledge receipt and understanding of these Home Care Instruction.  2. My questions regarding this information have been answered to my satisfaction.  3. I have formulated a plan with my discharge nurse to obtain my prescribed medications for home.    __________________________________      __________________________________                   Patient Signature                                 Guardian/Responsible Adult Signature      __________________________________                 __________       ________                       Nurse Signature                                               Date                 Time

## 2017-01-25 NOTE — PROCEDURES
DATE OF SERVICES:  01/25/2017    TIME:  09:02 a.m.    ENDOSCOPIC PROCEDURES PERFORMED:  1.  Endoscopic retrograde cholangiopancreatography with biliary   sphincterotomy.  2.  Endoscopic retrograde cholangiopancreatography with biliary ductal stones   extraction, clearance complete.  3.  Pancreatobiliary (upper) endoscopic ultrasound.  4.  Esophagogastroduodenoscopy with gastric mucosal biopsies.  5.  Cholangiogram radiological interpretation.    PREOPERATIVE DIAGNOSES OR INDICATIONS:  Abnormal biliary duct MRCP imaging   showing biliary ductal dilatation, epigastric abdominal pain, gastroesophageal   reflux disease, and history of cholecystectomy in 2014 for gallstones.    POSTOPERATIVE DIAGNOSES OR FINDINGS:  1.  Choledocholithiasis with obstruction without cholangitis, extracted.  2.  Biliary sphincterotomy performed.  3.  Gastric biopsies to evaluate Helicobacter pylori status due to   gastroesophageal reflux disease and epigastric pain.    ENDOSCOPIST:  Bradley Carreon MD, [facility]    ANESTHESIA:  General anesthesia per Dr. Charbel Potts.    CONSENT:  Risks, benefits, and alternatives were discussed with patient and   patient's wife.  They were given opportunity to ask questions and discuss   other options, risks including but not limited to perforation, infection,   bleeding, missed lesions, possible need for surgery, cardiopulmonary event,   aspiration, stroke, hospitalization, possibly prolonged discomfort, marked   repeat procedures, additional testing, pancreatitis, failed or incomplete   ERCP, retained choledocholithiasis, contraextraction, radiation exposure,   stent migration, and/or occlusion if inserted, and other potential   life-threatening complications.  Discussion was undertaken in Layman's terms.    They stated clear understanding and acceptance or risking was to pursue or   procedures as details in this note.  Consent was given by them in clear state   of mind.    PROCEDURES IN DETAIL:  Diagnostic  endoscope was inserted from mouth to second   portion of the duodenum.  Retroflexion was performed in the stomach.  Gastric   biopsies were obtained to evaluate for Helicobacter pylori status.  Next, a   curvilinear echoendoscope was inserted from mouth to second portion of the   duodenum.  Pancreas was examined with identification of normal vascular   landmarks including superior mesenteric artery, superior mesenteric vein,   portal vein, splenic vein, portal vein, splenic vein confluence as well as   splenorenal junction.  The celiac axis was examined to look for enlarged lymph   nodes.  The biliary duct was traced from intrapancreatic portion to hepatic   hilum to look for echogenic stones.  Gallbladder was surgically absent.    Lastly, a side viewing therapeutic duodenoscope was inserted from mouth to   second portion of the duodenum.  Biliary duct was selectively cannulated on   first attempt with a standard papillotome.  Successful free cannulation was   achieved.  Cholangiogram was completed, then a biliary sphincterotomy was   performed with a bow-papillotome to facilitate common biliary ductal stones   extraction, clearance was complete with no evidence of residual stones upon   completion cholangiogram.  Pancreatic duct was intentionally not cannulated   nor injected.  Of note, no radiologist was present to help obtain interpret   static or dynamic cholangiogram images.  I was the sole physician who   independently obtained and interpreted static and dynamic cholangiogram   images.  No overread was requested from the radiologist nor was it necessary.    There was suction of insufflated air and stomach fluid contents upon removal.    ESOPHAGOGASTRODUODENOSCOPY FINDINGS:  1.  Esophagus:  Endoscopically unremarkable, no evidence of Marx's   esophagus.  2.  Stomach:  Endoscopically unremarkable.  Gastric biopsies obtained to   evaluate for Helicobacter pylori status.  3.  Duodenum:  Endoscopically  unremarkable second portion.    UPPER ENDOSCOPIC ULTRASOUND FINDINGS:  1.  Pancreas:  Some echogenic stranding, but no other features suggestive of   chronic pancreatitis.  No evidence of pancreatic ductal dilatation or   echogenic pancreatic mass.  2.  Biliary duct:  Dilated greater than 1 cm containing 3-4 mm echogenic   shadowing common biliary duct stones, mostly in the distal aspect.  No   evidence of pancreatic head stricture.  3.  Lymph nodes:  No echogenic celiac axis lymph nodes.  4.  Gallbladder:  Surgically absent.    ENDOSCOPIC RETROGRADE CHOLANGIOPANCREATOGRAM FINDINGS:  1.  Ampulla of Vater:  Located in second portion of the duodenum appeared   endoscopically unremarkable.  2.  Cholangiogram:  Distal choledocholithiasis noted upstream ductal   dilatation greater than 1.2 cm, no evidence of primary sclerosing cholangitis   or intrahepatic duct strictures.  No evidence of biliary duct stricture.    Multiple right upper quadrant surgical clips consistent with prior   laparoscopic cholecystectomy and intra-abdominal surgeries.  3.  Pancreatogram:  Pancreatic duct was intentionally not cannulated nor   injected.  4.  Therapy:  Biliary sphincterotomy performed with balloon common biliary   ductal stones extraction, clearance of small 3-4 mm stones and biliary sludge,   yellowish, easily fragmented, no evidence of residual stones upon completion   cholangiogram.    IMPRESSION:  1.  Choledocholithiasis with obstruction without cholangitis,   treated/extracted.  2.  Biliary sphincterotomy performed.  3.  Gastric biopsies to evaluate Helicobacter pylori status.  4.  History of laparoscopic cholecystectomy.    RECOMMENDATIONS:  1.  Hold aspirin for 5 days.  2.  Okay to restart Coumadin tonight.  3.  Follow up with established GI physician, Dr. Owen Luciano in 2-4 weeks.       ____________________________________     MD JAZMINE RUBY / NTS    DD:  01/25/2017 09:09:20  DT:  01/25/2017 10:02:22    D#:   [id number]  Job#:  [id number]    cc: MISTY JENNINGS MD, LEEANNE BLANCAS MD

## 2017-01-25 NOTE — OR SURGEON
Immediate Post-Operative Note      PreOp Diagnosis: abnormal biliary imaging, epigastric pain    PostOp Diagnosis: choledocholithiasis with obstruction, no cholangitis    Procedure(s) (LRB):  EGD with gastric biopsies  Pancreatobiliary (upper) endoscopic ultrasound  ERCP biliary sphincterotomy  ERCP balloon common bile duct stones extraction    Endoscopist(s):  Bradley Carreon M.D., [facility]    Anesthesiologist/Type of Anesthesia:  Anesthesiologist: Charbel Potts M.D./General    Surgical Staff:  Circulator: Berry Funk R.N.  Endoscopy Technician: Philip Devlin    Specimen: gastric    Estimated Blood Loss: none    Findings: common bile duct stones    Complications: none        1/25/2017 8:57 AM Bradley Carreon

## 2017-01-25 NOTE — DISCHARGE INSTRUCTIONS
ENDOSCOPY HOME CARE INSTRUCTIONS    GASTROSCOPY OR ERCP  1. Don't eat or drink anything for about an hour after the test. You can then resume your regular diet.  2. Don't drive or drink alcohol for 24 hours. The medication you received will make you too drowsy.  3. Don't take any coffee, tea, or aspirin products until after you see your doctor. These can harm the lining of your stomach.  4. If you begin to vomit bloody material, or develop black or bloody stools, call your doctor as soon as possible.  5. If you have any neck, chest, abdominal pain or temp of 100 degrees, call your doctor.  6. See your doctor [facility] office will call for follow-up with Dr. Luciano  7. Additional instructions: No alcohol or sleeping pills today.   Patient not to drive, operate heavy machinery or make important decisions for 24 hours.    Hold aspirin for 5 days, okay to restart coumadin tonight.     Results to convey to patient: common bile duct stones extracted after biliary sphincterotomy (opening for tubes draining liver were widened or opened for removal of stones).    You should call 911 if you develop problems with breathing or chest pain.  If any questions arise, call your doctor. If your doctor is not available, please feel free to call [phone]. You can also call the HEALTH HOTLINE open 24 hours/day, 7 days/week and speak to a nurse at [phone], or toll free [phone].    Depression / Suicide Risk    As you are discharged from this [facility] facility, it is important to learn how to keep safe from harming yourself.    Recognize the warning signs:  · Abrupt changes in personality, positive or negative- including increase in energy   · Giving away possessions  · Change in eating patterns- significant weight changes-  positive or negative  · Change in sleeping patterns- unable to sleep or sleeping all the time   · Unwillingness or inability to communicate  · Depression  · Unusual sadness, discouragement  and loneliness  · Talk of wanting to die  · Neglect of personal appearance   · Rebelliousness- reckless behavior  · Withdrawal from people/activities they love  · Confusion- inability to concentrate     If you or a loved one observes any of these behaviors or has concerns about self-harm, here's what you can do:  · Talk about it- your feelings and reasons for harming yourself  · Remove any means that you might use to hurt yourself (examples: pills, rope, extension cords, firearm)  · Get professional help from the community (Mental Health, Substance Abuse, psychological counseling)  · Do not be alone:Call your Safe Contact- someone whom you trust who will be there for you.  · Call your local CRISIS HOTLINE 441-6508 or 890-942-9081  · Call your local Children's Mobile Crisis Response Team [place] (101) 242-0411 or www.[employer]  · Call the toll free National Suicide Prevention Hotlines   · National Suicide Prevention Lifeline 781-072-SCNV (5951)  · [place] [facility] Line Network 800-SUICIDE (236-5832)    I acknowledge receipt and understanding of these Home Care Instructions.    Discharge Education for patients on ROBERT (Obstructive Sleep Apnea) Protocol    Prior to receiving sedation or anesthesia, we screen all patients for Obstructive Sleep Apnea.  During your screening, you were identified as having suspected, but not confirmed Obstructive Sleep Apnea(ROBERT).    What is Obstructive Sleep Apnea?  Sleep apnea (AP-ne-ah) is a common disorder which involves breathing pauses that occur during sleep.  These can last from 10 seconds to a minute or longer.  Normal breathing resumes often with a loud snort or choking sound.    Sleep apnea occurs in all age groups and both genders but is more common in men and people over 40 years of age.  It has been estimated that as many as 18 million Americans have sleep apnea.  Most people who have sleep apnea don’t know they have it because it only occurs during sleep.  A family  member and/or bed partner may first notice the signs of sleep apnea.  Sleep apnea is a chronic (ongoing) condition that disrupts the quality and quantity of your sleep repeatedly throughout the night.  This often results in excessive daytime sleepiness or fatigue during the day.  It may also contribute to high blood pressure, heart problems, and complications following medications used for surgery and procedures.    To establish a definitive diagnosis, further testing from a specialist would be needed.  We recommend that you follow up with your primary care physician.    We recommend that you should be with an adult observer for at least 24 hours after your sedation/anesthesia.  If you have a CPAP machine, you should wear it during any sleep period (day or night) for the week following your procedure.  We encourage you to sleep on your side or in a sitting position, even with napping.  Lying flat on your back increases the risk of apnea and airway obstruction during your post procedure recovery period.    It is important to prevent over-sedation that could increase your risk for apnea.  Please take all pain medication as directed by your physician.  If you are not getting pain relief, please contact your physician to discuss possible approaches to relieving pain while minimizing medications that can affect your breathing and oxygen levels.

## 2017-01-30 ENCOUNTER — ANTICOAGULATION VISIT (OUTPATIENT)
Dept: MEDICAL GROUP | Facility: PHYSICIAN GROUP | Age: 77
End: 2017-01-30
Payer: MEDICARE

## 2017-01-30 DIAGNOSIS — I48.20 CHRONIC ATRIAL FIBRILLATION (HCC): ICD-10-CM

## 2017-01-30 LAB — INR PPP: 1.9 (ref 2–3.5)

## 2017-01-30 PROCEDURE — G8420 CALC BMI NORM PARAMETERS: HCPCS | Performed by: NURSE PRACTITIONER

## 2017-01-30 PROCEDURE — 85610 PROTHROMBIN TIME: CPT | Performed by: NURSE PRACTITIONER

## 2017-01-30 PROCEDURE — 99211 OFF/OP EST MAY X REQ PHY/QHP: CPT | Performed by: NURSE PRACTITIONER

## 2017-01-30 PROCEDURE — 4040F PNEUMOC VAC/ADMIN/RCVD: CPT | Performed by: NURSE PRACTITIONER

## 2017-01-30 NOTE — MR AVS SNAPSHOT
"        Isaiah Coronel   2017 9:00 AM   Anticoagulation Visit   MRN: [id number]    Department:  [facility]   Dept Phone:  [phone]    Description:  Male : 1940   Provider:  Kelvin Guy, BCD           Allergies as of 2017     Allergen Noted Reactions    Other Environmental 2012       \"My nose and eyes are bothered when I walk outside.\"      You were diagnosed with     Chronic atrial fibrillation (CMS-HCC)   [973728]         Vital Signs     Smoking Status                   Former Smoker           Basic Information     Date Of Birth Sex Race Ethnicity Preferred Language    1940 Male White Non- English      Your appointments     2017  8:00 AM   Anti-Coag Routine with [facility]   [address] [id number] [phone]            Mar 20, 2017 10:00 AM   ANNUAL EXAM PREVENTATIVE with OSCAR Hutchinson   [address] [id number] [phone]            2017 10:40 AM   Established Patient with Avery Nelson M.D.   [facility] [address] [place] [id number] [phone]           You will be receiving a confirmation call a few days before your appointment from our automated call confirmation system.              Problem List              ICD-10-CM Priority Class Noted - Resolved    Essential hypertension I10   2011 - Present    COPD (chronic obstructive pulmonary disease) (CMS-HCC) J44.9   3/9/2012 - Present    S/P colonoscopy ; NIA 5 YR- at [facility] [address] Z98.890   3/9/2012 - Present    Left ventricular systolic dysfunction-EF=45-50% ECHO - normal cardiac cath - dr khan at [facility] I51.9   2012 - Present    Mixed hyperlipidemia E78.2   2012 - Present    Vitamin D deficiency disease E55.9   2013 - Present    Achalasia of esophagus- " fundoplication at [facility] 2014- [facility] K22.0   2/26/2014 - Present    Gastroesophageal reflux disease with esophagitis- fundoplication at [facility] 2014 K21.0   4/9/2015 - Present    BPH (benign prostatic hyperplasia)- neg cysto 2014 N40.0   4/9/2015 - Present    Atrial fibrillation (HCC)- dr khan I48.91   10/14/2015 - Present    Anticoagulated on warfarin- a fib, dr rhodes Z79.01   11/24/2015 - Present    Right hip pain- dr hughes M25.551   11/24/2015 - Present    Chronic low back pain- dr hughes M54.5, G89.29   5/27/2016 - Present    Moderate episode of recurrent major depressive disorder (CMS-HCC) F33.1   12/1/2016 - Present    Abdominal pain R10.9 Medium  1/25/2017 - Present    Biliary tract imaging abnormality R93.2 High  1/25/2017 - Present    Choledocholithiasis with obstruction K80.51 High  1/25/2017 - Present      Health Maintenance        Date Due Completion Dates    IMM DTaP/Tdap/Td Vaccine (1 - Tdap) 1/11/1960 ---    IMM ZOSTER VACCINE 1/11/2001 ---    IMM PNEUMOCOCCAL 65+ (ADULT) LOW/MEDIUM RISK SERIES (2 of 2 - PPSV23) 1/14/2016 1/14/2015    COLONOSCOPY 8/31/2020 8/31/2015, 1/29/2013            Results     POCT Protime      Component    INR    1.9    Comment:     139 818-12 11/2017 ic valid                        Current Immunizations     13-VALENT PCV PREVNAR 1/14/2015    Influenza TIV (IM) 9/1/2012    Influenza Vaccine 9/1/2011, 10/1/2010    Influenza Vaccine Adult HD 9/15/2016    Influenza Vaccine Quad Inj (Preserved) 10/17/2014    Pneumococcal Vaccine (UF)Historical Data 10/1/2010      Below and/or attached are the medications your provider expects you to take. Review all of your home medications and newly ordered medications with your provider and/or pharmacist. Follow medication instructions as directed by your provider and/or pharmacist. Please keep your medication list with you and share with your provider. Update the information when medications are discontinued, doses are changed, or new medications  (including over-the-counter products) are added; and carry medication information at all times in the event of emergency situations     Allergies:  OTHER ENVIRONMENTAL - (reactions not documented)               Medications  Valid as of: January 30, 2017 -  9:01 AM    Generic Name Brand Name Tablet Size Instructions for use    Aspirin (Chew Tab) ASA 81 MG Take 1 Tab by mouth every day.        Cholecalciferol (Cap) Vitamin D 2000 UNITS Take  by mouth.        Citalopram Hydrobromide (Tab) CELEXA 10 MG Take 1 Tab by mouth every day.        Cyanocobalamin (Tab) VITAMIN B-12 500 MCG Take 500 mcg by mouth 2 Times a Day.        Finasteride (Tab) PROSCAR 5 MG Take 1 Tab by mouth every day.        Lisinopril (Tab) PRINIVIL 20 MG Take 1 tablet by mouth  every day        Metoprolol Succinate (TABLET SR 24 HR) TOPROL XL 50 MG Take 1 Tab by mouth every day. Dr khan to refill        Multiple Vitamins-Minerals (Tab) THERAGRAN-M  Take 1 Tab by mouth every day.        Omega-3 Fatty Acids (Cap) OMEGA 3 FA 1000 MG Take 1 Cap by mouth 3 times a day, with meals.        Omeprazole (CAPSULE DELAYED RELEASE) PRILOSEC 20 MG Take 1 capsule by mouth  twice daily        Pravastatin Sodium (Tab) PRAVACHOL 40 MG Take 1 tablet by mouth  daily        Warfarin Sodium (Tab) COUMADIN 5 MG TAKE ONE AND ONE-HALF  TABLETS DAILY AS DIRECTED  BY COUMADIN CLINIC        .                 Medicines prescribed today were sent to:     [facility] - [address] [facility] [address]    Phone: [phone] Fax: [phone]    Open 24 Hours?: No    [facility] - [place] - [address]    [address] [place] [address]    Phone: [phone] Fax: [phone]    Open 24 Hours?: No    [id number] - [place] - [address]    [address] [place] [address]    Phone: [phone] Fax: [phone]    Open 24 Hours?: No      Medication refill instructions:       If  your prescription bottle indicates you have medication refills left, it is not necessary to call your provider’s office. Please contact your pharmacy and they will refill your medication.    If your prescription bottle indicates you do not have any refills left, you may request refills at any time through one of the following ways: The online [employer] system (except Urgent Care), by calling your provider’s office, or by asking your pharmacy to contact your provider’s office with a refill request. Medication refills are processed only during regular business hours and may not be available until the next business day. Your provider may request additional information or to have a follow-up visit with you prior to refilling your medication.   *Please Note: Medication refills are assigned a new Rx number when refilled electronically. Your pharmacy may indicate that no refills were authorized even though a new prescription for the same medication is available at the pharmacy. Please request the medicine by name with the pharmacy before contacting your provider for a refill.        Warfarin Dosing Calendar   January 2017 Details    Sun Mon Tue Wed Thu Fri Sat     1               2               3               4               5               6               7                 8               9               10               11               12               13               14                 15               16               17               18               19               20               21                 22               23               24               25               26               27               28                 29               30   1.9   7.5 mg   See details      31      5 mg              Date Details   01/30 This INR check   INR: 1.9   139 818-12 11/2017 ic valid               How to take your warfarin dose     To take:  5 mg Take 1 of the 5 mg tablets.    To take:  7.5 mg Take 1.5 of the 5 mg tablets.              Warfarin Dosing Calendar   February 2017 Details    Sun Mon Tue Wed Thu Fri Sat        1      7.5 mg         2      5 mg         3      7.5 mg         4      7.5 mg           5      7.5 mg         6      7.5 mg         7               8               9               10               11                 12               13               14               15               16               17               18                 19               20               21               22               23               24               25                 26               27               28                    Date Details   No additional details    Date of next INR:  2/6/2017         How to take your warfarin dose     To take:  5 mg Take 1 of the 5 mg tablets.    To take:  7.5 mg Take 1.5 of the 5 mg tablets.              MyChart Status: Patient Declined

## 2017-01-30 NOTE — PROGRESS NOTES
Anticoagulation Summary as of 1/30/2017     INR goal 2.0-3.0   Selected INR 1.9! (1/30/2017)   Maintenance plan 5 mg (5 mg x 1) on Tue, Thu; 7.5 mg (5 mg x 1.5) all other days   Weekly total 47.5 mg   Plan last modified OSCAR Villalta (6/23/2016)   Next INR check 2/6/2017   Target end date Indefinite    Indications   A-fib- chronic warfarin rx (Resolved) [I48.91]  Atrial fibrillation (HCC)- dr khan [I48.91]         Anticoagulation Episode Summary     INR check location [facility]    Preferred lab     Send INR reminders to     Comments       Anticoagulation Care Providers     Provider Role Specialty Phone number    Duncan Tierney M.D. Referring Family Medicine [phone]    Alonso Khan M.D.  Cardiology [phone]    Mikhail Camacho, PHARMD Responsible          Anticoagulation Patient Findings   Positives Other Complaints    Negatives Missed Doses, Extra Doses, Medication Changes, Antibiotic Use, Diet Changes, Dental/Other Procedures, Hospitalization, Bleeding Gums, Nose Bleeds, Blood in Urine, Blood in Stool, Any Bruising    Comments Recently held doses for GI procedure.        Patient is subtherapeutic today with INR of 1.9.  Pt denies any unusual s/s of bleeding, bruising, clotting or any changes to diet or medications.  Because he recently held doses and INR is trending, instructed patient to continue with current warfarin dosing regimen without bolus dosing.  Follow up in 1 weeks.    Kelvin Guy, BCD

## 2017-02-06 ENCOUNTER — ANTICOAGULATION VISIT (OUTPATIENT)
Dept: MEDICAL GROUP | Facility: PHYSICIAN GROUP | Age: 77
End: 2017-02-06
Payer: MEDICARE

## 2017-02-06 DIAGNOSIS — I48.20 CHRONIC ATRIAL FIBRILLATION (HCC): ICD-10-CM

## 2017-02-06 LAB — INR PPP: 2.2 (ref 2–3.5)

## 2017-02-06 PROCEDURE — 99211 OFF/OP EST MAY X REQ PHY/QHP: CPT | Performed by: NURSE PRACTITIONER

## 2017-02-06 PROCEDURE — G8420 CALC BMI NORM PARAMETERS: HCPCS | Performed by: NURSE PRACTITIONER

## 2017-02-06 PROCEDURE — 85610 PROTHROMBIN TIME: CPT | Performed by: NURSE PRACTITIONER

## 2017-02-06 PROCEDURE — 4040F PNEUMOC VAC/ADMIN/RCVD: CPT | Performed by: NURSE PRACTITIONER

## 2017-02-06 NOTE — MR AVS SNAPSHOT
"        Isaiah Coronel   2017 8:00 AM   Anticoagulation Visit   MRN: [id number]    Department:  [facility]   Dept Phone:  [phone]    Description:  Male : 1940   Provider:  Kelvin Guy PHARMD           Allergies as of 2017     Allergen Noted Reactions    Other Environmental 2012       \"My nose and eyes are bothered when I walk outside.\"      You were diagnosed with     Chronic atrial fibrillation (CMS-HCC)   [517647]         Vital Signs     Smoking Status                   Former Smoker           Basic Information     Date Of Birth Sex Race Ethnicity Preferred Language    1940 Male White Non- English      Your appointments     2017  8:00 AM   Anti-Coag Routine with Kelvin Guy PHARMD   [address] [id number] [phone]            Mar 03, 2017  8:00 AM   Anti-Coag Routine with Stuart PHARMACIST   [address] [id number] [phone]            Mar 20, 2017 10:00 AM   ANNUAL EXAM PREVENTATIVE with OSCAR Hutchinson   [address] [id number] [phone]            2017 10:40 AM   Established Patient with Avery Nelson M.D.   [address] [id number] [phone]           You will be receiving a confirmation call a few days before your appointment from our automated call confirmation system.              Problem List              ICD-10-CM Priority Class Noted - Resolved    Essential hypertension I10   2011 - Present    COPD (chronic obstructive pulmonary disease) (CMS-HCC) J44.9   3/9/2012 - Present    S/P colonoscopy ; NIA 5 YR- at [facility] Z98.890   3/9/2012 - Present    Left ventricular systolic dysfunction-EF=45-50% ECHO - normal cardiac cath - dr khan " at [facility] I51.9   6/19/2012 - Present    Mixed hyperlipidemia E78.2   6/19/2012 - Present    Vitamin D deficiency disease E55.9   12/2/2013 - Present    Achalasia of esophagus- fundoplication at [facility] 2014- GIC K22.0   2/26/2014 - Present    Gastroesophageal reflux disease with esophagitis- fundoplication at [facility] 2014 K21.0   4/9/2015 - Present    BPH (benign prostatic hyperplasia)- neg cysto 2014 N40.0   4/9/2015 - Present    Atrial fibrillation (HCC)- dr khan I48.91   10/14/2015 - Present    Anticoagulated on warfarin- a fib, dr rhodes Z79.01   11/24/2015 - Present    Right hip pain- dr hughes M25.551   11/24/2015 - Present    Chronic low back pain- dr hughes M54.5, G89.29   5/27/2016 - Present    Moderate episode of recurrent major depressive disorder (CMS-HCC) F33.1   12/1/2016 - Present    Abdominal pain R10.9 Medium  1/25/2017 - Present    Biliary tract imaging abnormality R93.2 High  1/25/2017 - Present    Choledocholithiasis with obstruction K80.51 High  1/25/2017 - Present      Health Maintenance        Date Due Completion Dates    IMM DTaP/Tdap/Td Vaccine (1 - Tdap) 1/11/1960 ---    IMM ZOSTER VACCINE 1/11/2001 ---    IMM PNEUMOCOCCAL 65+ (ADULT) LOW/MEDIUM RISK SERIES (2 of 2 - PPSV23) 1/14/2016 1/14/2015    COLONOSCOPY 8/31/2020 8/31/2015, 1/29/2013            Results     POCT Protime      Component    INR    2.2    Comment:     139 818-12 11/2017 ic valid                        Current Immunizations     13-VALENT PCV PREVNAR 1/14/2015    Influenza TIV (IM) 9/1/2012    Influenza Vaccine 9/1/2011, 10/1/2010    Influenza Vaccine Adult HD 9/15/2016    Influenza Vaccine Quad Inj (Preserved) 10/17/2014    Pneumococcal Vaccine (UF)Historical Data 10/1/2010      Below and/or attached are the medications your provider expects you to take. Review all of your home medications and newly ordered medications with your provider and/or pharmacist. Follow medication instructions as directed by your provider and/or  pharmacist. Please keep your medication list with you and share with your provider. Update the information when medications are discontinued, doses are changed, or new medications (including over-the-counter products) are added; and carry medication information at all times in the event of emergency situations     Allergies:  OTHER ENVIRONMENTAL - (reactions not documented)               Medications  Valid as of: February 06, 2017 -  7:52 AM    Generic Name Brand Name Tablet Size Instructions for use    Aspirin (Chew Tab) ASA 81 MG Take 1 Tab by mouth every day.        Cholecalciferol (Cap) Vitamin D 2000 UNITS Take  by mouth.        Citalopram Hydrobromide (Tab) CELEXA 10 MG Take 1 Tab by mouth every day.        Cyanocobalamin (Tab) VITAMIN B-12 500 MCG Take 500 mcg by mouth 2 Times a Day.        Finasteride (Tab) PROSCAR 5 MG Take 1 Tab by mouth every day.        Lisinopril (Tab) PRINIVIL 20 MG Take 1 tablet by mouth  every day        Metoprolol Succinate (TABLET SR 24 HR) TOPROL XL 50 MG Take 1 Tab by mouth every day. Dr khan to refill        Multiple Vitamins-Minerals (Tab) THERAGRAN-M  Take 1 Tab by mouth every day.        Omega-3 Fatty Acids (Cap) OMEGA 3 FA 1000 MG Take 1 Cap by mouth 3 times a day, with meals.        Omeprazole (CAPSULE DELAYED RELEASE) PRILOSEC 20 MG Take 1 capsule by mouth  twice daily        Pravastatin Sodium (Tab) PRAVACHOL 40 MG Take 1 tablet by mouth  daily        Warfarin Sodium (Tab) COUMADIN 5 MG TAKE ONE AND ONE-HALF  TABLETS DAILY AS DIRECTED  BY COUMADIN CLINIC        .                 Medicines prescribed today were sent to:     [facility] - [address] [facility] [address]    Phone: [phone] Fax: [phone]    Open 24 Hours?: No    [facility] [place] - [address] [facility].    [facility] [place] [address]    Phone: [phone] Fax: [phone]    Open 24 Hours?: No    [facility]  [id number] - [place] - [address]    [address] [place] [address]    Phone: [phone] Fax: [phone]    Open 24 Hours?: No      Medication refill instructions:       If your prescription bottle indicates you have medication refills left, it is not necessary to call your provider’s office. Please contact your pharmacy and they will refill your medication.    If your prescription bottle indicates you do not have any refills left, you may request refills at any time through one of the following ways: The online [employer] system (except Urgent Care), by calling your provider’s office, or by asking your pharmacy to contact your provider’s office with a refill request. Medication refills are processed only during regular business hours and may not be available until the next business day. Your provider may request additional information or to have a follow-up visit with you prior to refilling your medication.   *Please Note: Medication refills are assigned a new Rx number when refilled electronically. Your pharmacy may indicate that no refills were authorized even though a new prescription for the same medication is available at the pharmacy. Please request the medicine by name with the pharmacy before contacting your provider for a refill.        Warfarin Dosing Calendar   February 2017 Details    Sun Mon Tue Wed Thu Fri Sat        1               2               3               4                 5               6   2.2   7.5 mg   See details      7      5 mg         8      7.5 mg         9      5 mg         10      7.5 mg         11      7.5 mg           12      7.5 mg         13      7.5 mg         14      5 mg         15      7.5 mg         16      5 mg         17      7.5 mg         18      7.5 mg           19      7.5 mg         20      7.5 mg         21      5 mg         22      7.5 mg         23      5 mg         24      7.5 mg         25      7.5 mg           26      7.5 mg         27      7.5 mg          28      5 mg              Date Details   02/06 This INR check   INR: 2.2   139 818-12 11/2017 ic valid               How to take your warfarin dose     To take:  5 mg Take 1 of the 5 mg tablets.    To take:  7.5 mg Take 1.5 of the 5 mg tablets.           Warfarin Dosing Calendar   March 2017 Details    Sun Mon Tue Wed Thu Fri Sat        1      7.5 mg         2      5 mg         3      7.5 mg         4                 5               6               7               8               9               10               11                 12               13               14               15               16               17               18                 19               20               21               22               23               24               25                 26               27               28               29               30               31                 Date Details   No additional details    Date of next INR:  3/3/2017         How to take your warfarin dose     To take:  5 mg Take 1 of the 5 mg tablets.    To take:  7.5 mg Take 1.5 of the 5 mg tablets.              MyChart Status: Patient Declined

## 2017-02-06 NOTE — PROGRESS NOTES
Anticoagulation Summary as of 2/6/2017     INR goal 2.0-3.0   Selected INR 2.2 (2/6/2017)   Maintenance plan 5 mg (5 mg x 1) on Tue, Thu; 7.5 mg (5 mg x 1.5) all other days   Weekly total 47.5 mg   Plan last modified OSCAR Villalta (6/23/2016)   Next INR check 3/3/2017   Target end date Indefinite    Indications   A-fib- chronic warfarin rx (Resolved) [I48.91]  Atrial fibrillation (HCC)- dr khan [I48.91]         Anticoagulation Episode Summary     [facility] location [facility]    Preferred lab     Send INR reminders to     Comments       Anticoagulation Care Providers     Provider Role Specialty Phone number    Duncan Tierney M.D. Referring Family Medicine [phone]    lAonso Khan M.D.  Cardiology [phone]    Mikhail Camacho, PHARMD Responsible          Anticoagulation Patient Findings   Negatives Missed Doses, Extra Doses, Medication Changes, Antibiotic Use, Diet Changes, Dental/Other Procedures, Hospitalization, Bleeding Gums, Nose Bleeds, Blood in Urine, Blood in Stool, Any Bruising, Other Complaints        Patient is therapeutic today with INR of 2.2.  Pt denies any unusual s/s of bleeding, bruising, clotting or any changes to diet or medications.  Pt is to continue with current warfarin dosing regimen.  Follow up in 3.5 weeks per patient.    Kelvin Guy, BCD

## 2017-02-08 DIAGNOSIS — I48.91 ATRIAL FIBRILLATION, UNSPECIFIED TYPE (HCC): ICD-10-CM

## 2017-03-03 ENCOUNTER — ANTICOAGULATION VISIT (OUTPATIENT)
Dept: MEDICAL GROUP | Facility: PHYSICIAN GROUP | Age: 77
End: 2017-03-03
Payer: MEDICARE

## 2017-03-03 DIAGNOSIS — I48.91 ATRIAL FIBRILLATION, UNSPECIFIED TYPE (HCC): ICD-10-CM

## 2017-03-03 LAB — INR PPP: 2.7 (ref 2–3.5)

## 2017-03-03 PROCEDURE — 99211 OFF/OP EST MAY X REQ PHY/QHP: CPT | Performed by: FAMILY MEDICINE

## 2017-03-03 PROCEDURE — 4040F PNEUMOC VAC/ADMIN/RCVD: CPT | Performed by: FAMILY MEDICINE

## 2017-03-03 PROCEDURE — 85610 PROTHROMBIN TIME: CPT | Performed by: FAMILY MEDICINE

## 2017-03-03 PROCEDURE — G8420 CALC BMI NORM PARAMETERS: HCPCS | Performed by: FAMILY MEDICINE

## 2017-03-03 NOTE — MR AVS SNAPSHOT
"        Isaiah Coronel   3/3/2017 8:00 AM   Anticoagulation Visit   MRN: [id number]    Department:  [facility]   Dept Phone:  [phone]    Description:  Male : 1940   Provider:  Kelvin Guy PHARMD           Allergies as of 3/3/2017     Allergen Noted Reactions    Other Environmental 2012       \"My nose and eyes are bothered when I walk outside.\"      You were diagnosed with     Atrial fibrillation, unspecified type (CMS-HCC)   [1168165]         Vital Signs     Smoking Status                   Former Smoker           Basic Information     Date Of Birth Sex Race Ethnicity Preferred Language    1940 Male White Non- English      Your appointments     Mar 03, 2017  8:00 AM   Anti-Coag Routine with Kelvin Guy PHARMD   [address] [phone] [phone]            Mar 20, 2017 10:00 AM   ANNUAL EXAM PREVENTATIVE with OSCAR Hutchinson   [address] [phone]            Mar 31, 2017  8:00 AM   Anti-Coag Routine with [place] PHARMACIST   [address] [phone]            2017 10:40 AM   Established Patient with Avery Nelson M.D.   [address] [phone]           You will be receiving a confirmation call a few days before your appointment from our automated call confirmation system.              Problem List              ICD-10-CM Priority Class Noted - Resolved    Essential hypertension I10   2011 - Present    COPD (chronic obstructive pulmonary disease) (CMS-HCC) J44.9   3/9/2012 - Present    S/P colonoscopy ; NIA 5 YR- at [facility] Z98.890   3/9/2012 - Present    Left ventricular systolic dysfunction-EF=45-50% ECHO - normal cardiac cath " 2014- dr khan at [facility] I51.9   6/19/2012 - Present    Mixed hyperlipidemia E78.2   6/19/2012 - Present    Vitamin D deficiency disease E55.9   12/2/2013 - Present    Achalasia of esophagus- fundoplication at [facility] 2014- GIC K22.0   2/26/2014 - Present    Gastroesophageal reflux disease with esophagitis- fundoplication at [facility] 2014 K21.0   4/9/2015 - Present    BPH (benign prostatic hyperplasia)- neg cysto 2014 N40.0   4/9/2015 - Present    Atrial fibrillation (HCC)- dr khan I48.91   10/14/2015 - Present    Anticoagulated on warfarin- a fib, dr rhodes Z79.01   11/24/2015 - Present    Right hip pain- dr hughes M25.551   11/24/2015 - Present    Chronic low back pain- dr hughes M54.5, G89.29   5/27/2016 - Present    Moderate episode of recurrent major depressive disorder (CMS-HCC) F33.1   12/1/2016 - Present    Abdominal pain R10.9 Medium  1/25/2017 - Present    Biliary tract imaging abnormality R93.2 High  1/25/2017 - Present    Choledocholithiasis with obstruction K80.51 High  1/25/2017 - Present      Health Maintenance        Date Due Completion Dates    IMM DTaP/Tdap/Td Vaccine (1 - Tdap) 1/11/1960 ---    IMM ZOSTER VACCINE 1/11/2001 ---    IMM PNEUMOCOCCAL 65+ (ADULT) LOW/MEDIUM RISK SERIES (2 of 2 - PPSV23) 1/14/2016 1/14/2015    COLONOSCOPY 8/31/2020 8/31/2015, 1/29/2013            Results     POCT Protime      Component    INR    2.7    Comment:     144 580-11 1/2018 ic valid                        Current Immunizations     13-VALENT PCV PREVNAR 1/14/2015    Influenza TIV (IM) 9/1/2012    Influenza Vaccine 9/1/2011, 10/1/2010    Influenza Vaccine Adult HD 9/15/2016    Influenza Vaccine Quad Inj (Preserved) 10/17/2014    Pneumococcal Vaccine (UF)Historical Data 10/1/2010      Below and/or attached are the medications your provider expects you to take. Review all of your home medications and newly ordered medications with your provider and/or pharmacist. Follow medication instructions as directed by your provider  and/or pharmacist. Please keep your medication list with you and share with your provider. Update the information when medications are discontinued, doses are changed, or new medications (including over-the-counter products) are added; and carry medication information at all times in the event of emergency situations     Allergies:  OTHER ENVIRONMENTAL - (reactions not documented)               Medications  Valid as of: March 03, 2017 -  7:44 AM    Generic Name Brand Name Tablet Size Instructions for use    Aspirin (Chew Tab) ASA 81 MG Take 1 Tab by mouth every day.        Cholecalciferol (Cap) Vitamin D 2000 UNITS Take  by mouth.        Citalopram Hydrobromide (Tab) CELEXA 10 MG Take 1 Tab by mouth every day.        Cyanocobalamin (Tab) VITAMIN B-12 500 MCG Take 500 mcg by mouth 2 Times a Day.        Finasteride (Tab) PROSCAR 5 MG Take 1 Tab by mouth every day.        Lisinopril (Tab) PRINIVIL 20 MG Take 1 tablet by mouth  every day        Metoprolol Succinate (TABLET SR 24 HR) TOPROL XL 50 MG Take 1 Tab by mouth every day. Dr khan to refill        Multiple Vitamins-Minerals (Tab) THERAGRAN-M  Take 1 Tab by mouth every day.        Omega-3 Fatty Acids (Cap) OMEGA 3 FA 1000 MG Take 1 Cap by mouth 3 times a day, with meals.        Omeprazole (CAPSULE DELAYED RELEASE) PRILOSEC 20 MG Take 1 capsule by mouth  twice daily        Pravastatin Sodium (Tab) PRAVACHOL 40 MG Take 1 tablet by mouth  daily        Warfarin Sodium (Tab) COUMADIN 5 MG Take 1 and 1/2 tablets by mouth once daily or as directed by coumadin clinic        .                 Medicines prescribed today were sent to:     [facility] - [address] [facility] [address]    Phone: [phone] Fax: [phone]    Open 24 Hours?: No    [facility] - [place] - [address]    [facility] [place] [address]    Phone: [phone] Fax: [phone]    Open 24 Hours?: No    [facility] - [place], [place] - [address]    [address] [place] [address]    Phone: [phone] Fax: [phone]    Open 24 Hours?: No      Medication refill instructions:       If your prescription bottle indicates you have medication refills left, it is not necessary to call your provider’s office. Please contact your pharmacy and they will refill your medication.    If your prescription bottle indicates you do not have any refills left, you may request refills at any time through one of the following ways: The online [employer] system (except Urgent Care), by calling your provider’s office, or by asking your pharmacy to contact your provider’s office with a refill request. Medication refills are processed only during regular business hours and may not be available until the next business day. Your provider may request additional information or to have a follow-up visit with you prior to refilling your medication.   *Please Note: Medication refills are assigned a new Rx number when refilled electronically. Your pharmacy may indicate that no refills were authorized even though a new prescription for the same medication is available at the pharmacy. Please request the medicine by name with the pharmacy before contacting your provider for a refill.        Warfarin Dosing Calendar   March 2017 Details    Sun Mon Tue Wed Thu Fri Sat        1               2               3   2.7   7.5 mg   See details      4      7.5 mg           5      7.5 mg         6      7.5 mg         7      5 mg         8      7.5 mg         9      5 mg         10      7.5 mg         11      7.5 mg           12      7.5 mg         13      7.5 mg         14      5 mg         15      7.5 mg         16      5 mg         17      7.5 mg         18      7.5 mg           19      7.5 mg         20      7.5 mg         21      5 mg         22      7.5 mg         23      5 mg         24      7.5 mg         25      7.5 mg           26      7.5 mg          27      7.5 mg         28      5 mg         29      7.5 mg         30      5 mg         31      7.5 mg           Date Details   03/03 This INR check   INR: 2.7   144 580-11 1/2018 ic valid       Date of next INR:  3/31/2017         How to take your warfarin dose     To take:  5 mg Take 1 of the 5 mg tablets.    To take:  7.5 mg Take 1.5 of the 5 mg tablets.              MyChart Status: Patient Declined

## 2017-03-03 NOTE — PROGRESS NOTES
Anticoagulation Summary as of 3/3/2017     INR goal 2.0-3.0   Selected INR 2.7 (3/3/2017)   Maintenance plan 5 mg (5 mg x 1) on Tue, Thu; 7.5 mg (5 mg x 1.5) all other days   Weekly total 47.5 mg   Plan last modified OSCAR Villalta (6/23/2016)   Next INR check 3/31/2017   Target end date Indefinite    Indications   A-fib- chronic warfarin rx (Resolved) [I48.91]  Atrial fibrillation (HCC)- dr khan [I48.91]         Anticoagulation Episode Summary     [facility]R check location [facility]    Preferred lab     Send INR reminders to     Comments       Anticoagulation Care Providers     Provider Role Specialty Phone number    Duncan Tierney M.D. Referring Family Medicine [phone]    Alonso Khan M.D.  Cardiology [phone]    Mikhail Camacho, PHARMD Responsible          Anticoagulation Patient Findings   Negatives Missed Doses, Extra Doses, Medication Changes, Antibiotic Use, Diet Changes, Dental/Other Procedures, Hospitalization, Bleeding Gums, Nose Bleeds, Blood in Urine, Blood in Stool, Any Bruising, Other Complaints        Patient is therapeutic today with INR of 2.7.  Pt denies any unusual s/s of bleeding, bruising, clotting or any changes to diet or medications.  Pt is to continue with current warfarin dosing regimen.  Follow up in 4 weeks.    Kelvin Guy, PHARMD

## 2017-03-20 ENCOUNTER — APPOINTMENT (OUTPATIENT)
Dept: MEDICAL GROUP | Facility: PHYSICIAN GROUP | Age: 77
End: 2017-03-20
Payer: MEDICARE

## 2017-03-31 ENCOUNTER — ANTICOAGULATION VISIT (OUTPATIENT)
Dept: MEDICAL GROUP | Facility: PHYSICIAN GROUP | Age: 77
End: 2017-03-31
Payer: MEDICARE

## 2017-03-31 VITALS — HEART RATE: 40 BPM | SYSTOLIC BLOOD PRESSURE: 164 MMHG | DIASTOLIC BLOOD PRESSURE: 79 MMHG

## 2017-03-31 DIAGNOSIS — I48.91 ATRIAL FIBRILLATION, UNSPECIFIED TYPE (HCC): ICD-10-CM

## 2017-03-31 LAB — INR PPP: 2.8 (ref 2–3.5)

## 2017-03-31 PROCEDURE — 85610 PROTHROMBIN TIME: CPT | Performed by: FAMILY MEDICINE

## 2017-03-31 PROCEDURE — 99999 PR NO CHARGE: CPT | Performed by: FAMILY MEDICINE

## 2017-03-31 PROCEDURE — G8419 CALC BMI OUT NRM PARAM NOF/U: HCPCS | Performed by: FAMILY MEDICINE

## 2017-03-31 PROCEDURE — 4040F PNEUMOC VAC/ADMIN/RCVD: CPT | Performed by: FAMILY MEDICINE

## 2017-03-31 NOTE — MR AVS SNAPSHOT
"        Isaiah Coronel   3/31/2017 8:00 AM   Anticoagulation Visit   MRN: [id number]    Department:  [facility]   Dept Phone:  [phone]    Description:  Male : 1940   Provider:  Kelvin Guy PHARMD           Allergies as of 3/31/2017     Allergen Noted Reactions    Other Environmental 2012       \"My nose and eyes are bothered when I walk outside.\"      You were diagnosed with     Atrial fibrillation, unspecified type (CMS-HCC)   [5121047]         Vital Signs     Blood Pressure Pulse Smoking Status             164/79 mmHg 40 Former Smoker         Basic Information     Date Of Birth Sex Race Ethnicity Preferred Language    1940 Male White Non- English      Your appointments     Mar 31, 2017  8:00 AM   Anti-Coag Routine with Kelvin Guy PHARMD   [address] [id number] [phone]            May 12, 2017  8:00 AM   Anti-Coag Routine with [place] PHARMACIST   [address] [address] [phone]            2017 10:40 AM   Established Patient with Avery Nelson M.D.   [address] [address] [phone]           You will be receiving a confirmation call a few days before your appointment from our automated call confirmation system.              Problem List              ICD-10-CM Priority Class Noted - Resolved    Essential hypertension I10   2011 - Present    COPD (chronic obstructive pulmonary disease) (CMS-HCC) J44.9   3/9/2012 - Present    S/P colonoscopy ; NIA 5 YR- at [facility] Z98.890   3/9/2012 - Present    Left ventricular systolic dysfunction-EF=45-50% ECHO - normal cardiac cath - dr khan at [facility] I51.9   2012 - Present    Mixed hyperlipidemia E78.2   2012 - Present    Vitamin D deficiency disease E55.9   2013 - Present   " Achalasia of esophagus- fundoplication at [facility] 2014- [facility] K22.0   2/26/2014 - Present    Gastroesophageal reflux disease with esophagitis- fundoplication at [facility] 2014 K21.0   4/9/2015 - Present    BPH (benign prostatic hyperplasia)- neg cysto 2014 N40.0   4/9/2015 - Present    Atrial fibrillation (HCC)- dr khan I48.91   10/14/2015 - Present    Anticoagulated on warfarin- a fib, dr rhodes Z79.01   11/24/2015 - Present    Right hip pain- dr hughes M25.551   11/24/2015 - Present    Chronic low back pain- dr hughes M54.5, G89.29   5/27/2016 - Present    Moderate episode of recurrent major depressive disorder (CMS-HCC) F33.1   12/1/2016 - Present    Abdominal pain R10.9 Medium  1/25/2017 - Present    Biliary tract imaging abnormality R93.2 High  1/25/2017 - Present    Choledocholithiasis with obstruction K80.51 High  1/25/2017 - Present      Health Maintenance        Date Due Completion Dates    IMM DTaP/Tdap/Td Vaccine (1 - Tdap) 1/11/1960 ---    IMM ZOSTER VACCINE 1/11/2001 ---    IMM PNEUMOCOCCAL 65+ (ADULT) LOW/MEDIUM RISK SERIES (2 of 2 - PPSV23) 1/14/2016 1/14/2015    COLONOSCOPY 8/31/2020 8/31/2015, 1/29/2013            Results     POCT Protime      Component    INR    2.8    Comment:     [id number] 2/2018 ic valid                        Current Immunizations     13-VALENT PCV PREVNAR 1/14/2015    Influenza TIV (IM) 9/1/2012    Influenza Vaccine 9/1/2011, 10/1/2010    Influenza Vaccine Adult HD 9/15/2016    Influenza Vaccine Quad Inj (Preserved) 10/17/2014    Pneumococcal Vaccine (UF)Historical Data 10/1/2010      Below and/or attached are the medications your provider expects you to take. Review all of your home medications and newly ordered medications with your provider and/or pharmacist. Follow medication instructions as directed by your provider and/or pharmacist. Please keep your medication list with you and share with your provider. Update the information when medications are discontinued, doses are changed, or  new medications (including over-the-counter products) are added; and carry medication information at all times in the event of emergency situations     Allergies:  OTHER ENVIRONMENTAL - (reactions not documented)               Medications  Valid as of: March 31, 2017 -  7:52 AM    Generic Name Brand Name Tablet Size Instructions for use    Aspirin (Chew Tab) ASA 81 MG Take 1 Tab by mouth every day.        Cholecalciferol (Cap) Vitamin D 2000 UNITS Take  by mouth.        Citalopram Hydrobromide (Tab) CELEXA 10 MG Take 1 Tab by mouth every day.        Cyanocobalamin (Tab) VITAMIN B-12 500 MCG Take 500 mcg by mouth 2 Times a Day.        Finasteride (Tab) PROSCAR 5 MG Take 1 Tab by mouth every day.        Lisinopril (Tab) PRINIVIL 20 MG Take 1 tablet by mouth  every day        Metoprolol Succinate (TABLET SR 24 HR) TOPROL XL 50 MG Take 1 Tab by mouth every day. Dr khan to refill        Multiple Vitamins-Minerals (Tab) THERAGRAN-M  Take 1 Tab by mouth every day.        Omega-3 Fatty Acids (Cap) OMEGA 3 FA 1000 MG Take 1 Cap by mouth 3 times a day, with meals.        Omeprazole (CAPSULE DELAYED RELEASE) PRILOSEC 20 MG Take 1 capsule by mouth  twice daily        Pravastatin Sodium (Tab) PRAVACHOL 40 MG Take 1 tablet by mouth  daily        Warfarin Sodium (Tab) COUMADIN 5 MG Take 1 and 1/2 tablets by mouth once daily or as directed by coumadin clinic        .                 Medicines prescribed today were sent to:     [facility] - [address] [facility] [address]    Phone: [phone] Fax: [phone]    Open 24 Hours?: No    [facility] [place] [address]    [address] [facility] [place] [address]    Phone: [phone] Fax: [phone]    Open 24 Hours?: No    [id number] - [place] - [address]    [address] [place] [address]    Phone: [phone] Fax: [phone]    Open 24 Hours?: No      Medication refill  instructions:       If your prescription bottle indicates you have medication refills left, it is not necessary to call your provider’s office. Please contact your pharmacy and they will refill your medication.    If your prescription bottle indicates you do not have any refills left, you may request refills at any time through one of the following ways: The online [employer] system (except Urgent Care), by calling your provider’s office, or by asking your pharmacy to contact your provider’s office with a refill request. Medication refills are processed only during regular business hours and may not be available until the next business day. Your provider may request additional information or to have a follow-up visit with you prior to refilling your medication.   *Please Note: Medication refills are assigned a new Rx number when refilled electronically. Your pharmacy may indicate that no refills were authorized even though a new prescription for the same medication is available at the pharmacy. Please request the medicine by name with the pharmacy before contacting your provider for a refill.        Warfarin Dosing Calendar March 2017 Details    Sun Mon Tue Wed Thu Fri Sat        1               2               3               4                 5               6               7               8               9               10               11                 12               13               14               15               16               17               18                 19               20               21               22               23               24               25                 26               27               28               29               30               31   2.8   7.5 mg   See details        Date Details   03/31 This INR check   INR: 2.8   [id number] 2/2018 ic valid               How to take your warfarin dose     To take:  7.5 mg Take 1.5 of the 5 mg tablets.           Warfarin Dosing  Calendar April 2017 Details    Sun Mon Tue Wed Thu Fri Sat           1      7.5 mg           2      7.5 mg         3      7.5 mg         4      5 mg         5      7.5 mg         6      5 mg         7      7.5 mg         8      7.5 mg           9      7.5 mg         10      7.5 mg         11      5 mg         12      7.5 mg         13      5 mg         14      7.5 mg         15      7.5 mg           16      7.5 mg         17      7.5 mg         18      5 mg         19      7.5 mg         20      5 mg         21      7.5 mg         22      7.5 mg           23      7.5 mg         24      7.5 mg         25      5 mg         26      7.5 mg         27      5 mg         28      7.5 mg         29      7.5 mg           30      7.5 mg                Date Details   No additional details            How to take your warfarin dose     To take:  5 mg Take 1 of the 5 mg tablets.    To take:  7.5 mg Take 1.5 of the 5 mg tablets.           Warfarin Dosing Calendar   May 2017 Details    Sun Mon Tue Wed Thu Fri Sat      1      7.5 mg         2      5 mg         3      7.5 mg         4      5 mg         5      7.5 mg         6      7.5 mg           7      7.5 mg         8      7.5 mg         9      5 mg         10      7.5 mg         11      5 mg         12      7.5 mg         13                 14               15               16               17               18               19               20                 21               22               23               24               25               26               27                 28               29               30               31                   Date Details   No additional details    Date of next INR:  5/12/2017         How to take your warfarin dose     To take:  5 mg Take 1 of the 5 mg tablets.    To take:  7.5 mg Take 1.5 of the 5 mg tablets.              MyChart Status: Patient Declined

## 2017-03-31 NOTE — PROGRESS NOTES
Anticoagulation Summary as of 3/31/2017     INR goal 2.0-3.0   Selected INR 2.8 (3/31/2017)   Maintenance plan 5 mg (5 mg x 1) on Tue, Thu; 7.5 mg (5 mg x 1.5) all other days   Weekly total 47.5 mg   Plan last modified [facility] (6/23/2016)   Next INR check 5/12/2017   Target end date Indefinite    Indications   A-fib- chronic warfarin rx (Resolved) [I48.91]  Atrial fibrillation (HCC)- dr khan [I48.91]         Anticoagulation Episode Summary     INR check location [facility]    Preferred lab     Send INR reminders to     Comments       Anticoagulation Care Providers     Provider Role Specialty Phone number    Duncan Tierney M.D. Referring Family Medicine [phone]    Alonso Khan M.D.  Cardiology [phone]    Mikhail Camacho, PHARMD Responsible          Anticoagulation Patient Findings   Positives Missed Doses, Hospitalization    Negatives Extra Doses, Medication Changes, Antibiotic Use, Diet Changes, Dental/Other Procedures, Bleeding Gums, Nose Bleeds, Blood in Urine, Blood in Stool, Any Bruising, Other Complaints    Comments Was off warfarin for procedure, restarted ~one week ago        Patient is therapeutic today with INR of 2.8.  Pt denies any unusual s/s of bleeding, bruising, clotting or any changes to diet or medications.  Pt is to continue with current warfarin dosing regimen.  Follow up in 6 weeks.    Kelvin Guy, PHARMD

## 2017-05-05 ENCOUNTER — OFFICE VISIT (OUTPATIENT)
Dept: URGENT CARE | Facility: PHYSICIAN GROUP | Age: 77
End: 2017-05-05
Payer: MEDICARE

## 2017-05-05 ENCOUNTER — HOSPITAL ENCOUNTER (OUTPATIENT)
Dept: RADIOLOGY | Facility: MEDICAL CENTER | Age: 77
End: 2017-05-05
Attending: FAMILY MEDICINE
Payer: MEDICARE

## 2017-05-05 VITALS
HEART RATE: 53 BPM | BODY MASS INDEX: 25.45 KG/M2 | WEIGHT: 192 LBS | HEIGHT: 73 IN | OXYGEN SATURATION: 97 % | SYSTOLIC BLOOD PRESSURE: 112 MMHG | RESPIRATION RATE: 18 BRPM | DIASTOLIC BLOOD PRESSURE: 62 MMHG | TEMPERATURE: 98.1 F

## 2017-05-05 DIAGNOSIS — S79.911A INJURY OF RIGHT HIP, INITIAL ENCOUNTER: ICD-10-CM

## 2017-05-05 DIAGNOSIS — M62.89 MUSCULAR IMBALANCE: ICD-10-CM

## 2017-05-05 DIAGNOSIS — S70.01XA CONTUSION OF RIGHT HIP, INITIAL ENCOUNTER: ICD-10-CM

## 2017-05-05 DIAGNOSIS — R26.81 UNSTEADINESS: ICD-10-CM

## 2017-05-05 PROCEDURE — G8432 DEP SCR NOT DOC, RNG: HCPCS | Performed by: FAMILY MEDICINE

## 2017-05-05 PROCEDURE — 4040F PNEUMOC VAC/ADMIN/RCVD: CPT | Performed by: FAMILY MEDICINE

## 2017-05-05 PROCEDURE — G8419 CALC BMI OUT NRM PARAM NOF/U: HCPCS | Performed by: FAMILY MEDICINE

## 2017-05-05 PROCEDURE — 1036F TOBACCO NON-USER: CPT | Performed by: FAMILY MEDICINE

## 2017-05-05 PROCEDURE — 1101F PT FALLS ASSESS-DOCD LE1/YR: CPT | Performed by: FAMILY MEDICINE

## 2017-05-05 PROCEDURE — 73502 X-RAY EXAM HIP UNI 2-3 VIEWS: CPT | Mod: RT

## 2017-05-05 PROCEDURE — 99214 OFFICE O/P EST MOD 30 MIN: CPT | Performed by: FAMILY MEDICINE

## 2017-05-05 NOTE — PROGRESS NOTES
Chief Complaint:    Chief Complaint   Patient presents with   • Hip Pain     fell & landed on right hip two weeks ago, pain has gotten worse       History of Present Illness:    This is a new problem. 2 weeks ago, was at granddaughter's softball game. Was going up bleachers and lost balance and landed on right hip onto ground. Initially it hurt in right hip but did not think anything of it. Since then pain is persisting and bothering him more. No meds taken for this. Has h/o bursitis in both hips and occl has to get steroid injection by Ortho Dr. Paez. Has h/o falling as his balance is suboptimal and wound be interested in going to Physical Therapy to help balance.      Review of Systems:    Constitutional: Negative for fever, chills, and diaphoresis.   Eyes: Negative for change in vision, photophobia, pain, redness, and discharge.  ENT: Negative for ear pain, ear discharge, hearing loss, tinnitus, nasal congestion, nosebleeds, and sore throat.    Respiratory: Negative for cough, hemoptysis, sputum production, shortness of breath, wheezing, and stridor.    Cardiovascular: Negative for chest pain, palpitations, orthopnea, claudication, leg swelling, and PND.   Gastrointestinal: Negative for abdominal pain, nausea, vomiting, diarrhea, constipation, blood in stool, and melena.   Genitourinary: No new symptoms.   Musculoskeletal: See HPI.  Skin: Negative for rash and itching.   Neurological: See HPI.  Endo: Negative for polydipsia.   Heme: On Warfarin.  Psychiatric/Behavioral: No new symptoms.    Past Medical History:    Past Medical History   Diagnosis Date   • Hypertension    • Hernia of unspecified site of abdominal cavity without mention of obstruction or gangrene    • Psychiatric problem      anxiety 2 years ago not any more   • Arthritis      hips   • Angina    • Indigestion    • Backpain      lower back pain and right side   • Cancer (CMS-[facility])      skin cancer on nose and shoulder 2006   • GERD  (gastroesophageal reflux disease)    • Hiatus hernia syndrome    • EMPHYSEMA    • Anticoagulant long-term use    • High cholesterol    • Atrial fibrillation, controlled (CMS-[facility])    • Bowel habit changes      diarrhea   • Bronchitis 1/2017   • Cold 1/2017   • Breath shortness    • Glaucoma      OU   • Cataract      OU   • Pneumonia      in past   • Dental disorder      partial lower   • Sleep apnea      no cpap- no testing       Past Surgical History:    Past Surgical History   Procedure Laterality Date   • Cholecystectomy  1990s   • Inguinal hernia repair  2005     bilateral   • Hiatal hernia repair  1990, 2014   • Adrenal tissue biopsy/ adrenalectomy Right 19902      with repair of hepatic attachment; neg for ca   • Inguinal hernia repair bilateral  12/19/2012     Performed by Monico Cox M.D. at [facility]   • Trans urethral resection prostate  2000     BPH   • Fundoplicaton  2014     open nissen   • Lesion excision general       removal of skin CA   • Gastroscopy N/A 1/25/2017     Procedure: GASTROSCOPY WITH DUODENAL BIOPSY;  Surgeon: Bradley Carreon M.D.;  Location: [facility];  Service:    • Egd w/endoscopic ultrasound N/A 1/25/2017     Procedure: EGD W/ENDOSCOPIC ULTRASOUND - CURVILINER UPPER PANCREATOBILIARY;  Surgeon: Bradley Carreon M.D.;  Location: [facility];  Service:    • Ercp w/removal calculus N/A 1/25/2017     Procedure: ERCP W/REMOVAL CALCULUS;  Surgeon: Bradley Carreon M.D.;  Location: [facility];  Service:        Social History:    Social History     Social History   • Marital Status:      Spouse Name: N/A   • Number of Children: N/A   • Years of Education: N/A     Occupational History   • Not on file.     Social History Main Topics   • Smoking status: Former Smoker -- 2.00 packs/day for 10 years     Types: Cigarettes     Quit date: 01/01/1971   • Smokeless tobacco: Never Used   • Alcohol Use: Yes      Comment: rare   • Drug  "Use: No   • Sexual Activity: Not on file     Other Topics Concern   • Not on file     Social History Narrative       Family History:    History reviewed. No pertinent family history.    Medications:    Current Outpatient Prescriptions on File Prior to Visit   Medication Sig Dispense Refill   • therapeutic multivitamin-minerals (THERAGRAN-M) Tab Take 1 Tab by mouth every day.     • finasteride (PROSCAR) 5 MG Tab Take 1 Tab by mouth every day. 90 Tab 4   • omeprazole (PRILOSEC) 20 MG delayed-release capsule Take 1 capsule by mouth  twice daily 90 Cap 4   • pravastatin (PRAVACHOL) 40 MG tablet Take 1 tablet by mouth  daily 90 Tab 4   • lisinopril (PRINIVIL) 20 MG Tab Take 1 tablet by mouth  every day 90 Tab 4   • citalopram (CELEXA) 10 MG tablet Take 1 Tab by mouth every day. 90 Tab 4   • Cholecalciferol (VITAMIN D) 2000 UNITS Cap Take  by mouth. 30 Cap    • metoprolol SR (TOPROL XL) 50 MG TABLET SR 24 HR Take 1 Tab by mouth every day. Dr khan to refill 90 Tab 1   • aspirin (ASA) 81 MG Chew Tab chewable tablet Take 1 Tab by mouth every day. 100 Tab    • docosahexanoic acid (OMEGA 3 FA) 1000 MG CAPS Take 1 Cap by mouth 3 times a day, with meals. 90 Cap 11   • cyanocobalamin (VITAMIN B-12) 500 MCG TABS Take 500 mcg by mouth 2 Times a Day.     • warfarin (COUMADIN) 5 MG Tab Take 1 and 1/2 tablets by mouth once daily or as directed by coumadin clinic 135 Tab 1     No current facility-administered medications on file prior to visit.       Allergies:    Allergies   Allergen Reactions   • Other Environmental      \"My nose and eyes are bothered when I walk outside.\"       Vitals:    Filed Vitals:    05/05/17 1302   BP: 112/62   Pulse: 53   Temp: 36.7 °C (98.1 °F)   Resp: 18   Height: 1.854 m (6' 0.99\")   Weight: 87.091 kg (192 lb)   SpO2: 97%       Physical Exam:    Constitutional: Vital signs reviewed. Appears well-developed and well-nourished. No acute distress.   Eyes: Sclera white, conjunctivae clear.  ENT: External ears " normal. Hearing normal.  Cardiovascular: Peripheral pulses 2+.   Pulmonary/Chest: Respirations non-labored.  Musculoskeletal: Antalgic gait. Right hip: focal soft tissue swelling laterally, felt on palpation, with mild tenderness to palpation. Externally looks normal. Some motions of right hip exacerbate discomfort laterally. No muscular atrophy or weakness.  Neurological: Alert and oriented to person, place, and time. Muscle tone normal. Coordination normal. Light touch and sensation normal.   Skin: No rashes or lesions. Warm, dry, normal turgor.  Psychiatric: Normal mood and affect. Behavior is normal. Judgment and thought content normal.     Diagnostics:     DX-HIP-COMPLETE - UNILATERAL 2+ RIGHT (Order #[id number]) on 5/5/17       Narrative        5/5/2017 1:20 PM    HISTORY/REASON FOR EXAM:  Pelvic/Hip Pain Following Trauma.  Progressing hip pain after ground-level fall    TECHNIQUE/EXAM DESCRIPTION AND NUMBER OF VIEWS:  2 views of the RIGHT hip.    COMPARISON: 6/1/2016    FINDINGS:  Femoral heads are seated within the acetabula. Joint spaces are maintained. There is minimal acetabular spurring. There is no diastases of the symphysis pubis. Mild degenerative changes are seen of the sacroiliac joints and in the visualized lower lumbar   spine. Calcific densities in the pelvis likely represent phleboliths. Atherosclerotic calcification is seen.         Impression        No fracture or dislocation is identified. If clinical suspicion for fracture is high, further evaluation can be obtained with CT or MRI.    Mild degenerative changes.     Images and Rad report reviewed with him and copies to him.      Assessment / Plan:    1. Injury of right hip, initial encounter  - DX-HIP-COMPLETE - UNILATERAL 2+ RIGHT; Future  - REFERRAL TO PHYSICAL THERAPY Reason for Therapy: Eval/Treat/Report    2. Unsteadiness  - REFERRAL TO PHYSICAL THERAPY Reason for Therapy: Eval/Treat/Report    3. Muscular imbalance  - REFERRAL TO PHYSICAL  THERAPY Reason for Therapy: Eval/Treat/Report    4. Contusion of right hip, initial encounter  - REFERRAL TO PHYSICAL THERAPY Reason for Therapy: Eval/Treat/Report      Discussed with him DDX and management options.    Agreeable to Physical Therapy ordered.    Due to Warfarin, will avoid anti-inflammatory medications.    Declines Rx pain medication.    May use OTC Tylenol prn pain.    Follow-up with PCP, urgent care, or his Orthopedic Physician Dr. Paez if getting worse or not better with above.

## 2017-05-05 NOTE — MR AVS SNAPSHOT
"        Isaiah Coronel   2017 12:50 PM   Office Visit   MRN: [id number]    Department:  [facility]   Dept Phone:  [phone]    Description:  Male : 1940   Provider:  Noe Carreon M.D.           Reason for Visit     Hip Pain fell & landed on right hip two weeks ago, pain has gotten worse      Allergies as of 2017     Allergen Noted Reactions    Other Environmental 2012       \"My nose and eyes are bothered when I walk outside.\"      You were diagnosed with     Injury of right hip, initial encounter   [523900]       Unsteadiness   [929798]       Muscular imbalance   [359651]       Contusion of right hip, initial encounter   [338514]         Vital Signs     Blood Pressure Pulse Temperature Respirations Height Weight    112/62 mmHg 53 36.7 °C (98.1 °F) 18 1.854 m (6' 0.99\") 87.091 kg (192 lb)    Body Mass Index Oxygen Saturation Smoking Status             25.34 kg/m2 97% Former Smoker         Basic Information     Date Of Birth Sex Race Ethnicity Preferred Language    1940 Male White Non- English      Your appointments     May 12, 2017  8:00 AM   Anti-Coag Routine with [place] PHARMACIST   [address] [address] [phone]            2017 10:40 AM   Established Patient with Avery Nelson M.D.   [facility] [address] [place] [address] [phone]           You will be receiving a confirmation call a few days before your appointment from our automated call confirmation system.              Problem List              ICD-10-CM Priority Class Noted - Resolved    Essential hypertension I10   2011 - Present    COPD (chronic obstructive pulmonary disease) (CMS-HCC) J44.9   3/9/2012 - Present    S/P colonoscopy ; NIA 5 YR- at [facility] Z98.890   3/9/2012 - Present    Left ventricular systolic dysfunction-EF=45-50% ECHO - normal cardiac cath - " dr khan at [facility] I51.9   6/19/2012 - Present    Mixed hyperlipidemia E78.2   6/19/2012 - Present    Vitamin D deficiency disease E55.9   12/2/2013 - Present    Achalasia of esophagus- fundoplication at [facility] 2014- GIC K22.0   2/26/2014 - Present    Gastroesophageal reflux disease with esophagitis- fundoplication at [facility] 2014 K21.0   4/9/2015 - Present    BPH (benign prostatic hyperplasia)- neg cysto 2014 N40.0   4/9/2015 - Present    Atrial fibrillation (HCC)- dr khan I48.91   10/14/2015 - Present    Anticoagulated on warfarin- a fib, dr rhodes Z79.01   11/24/2015 - Present    Right hip pain- dr hughes M25.551   11/24/2015 - Present    Chronic low back pain- dr hughes M54.5, G89.29   5/27/2016 - Present    Moderate episode of recurrent major depressive disorder (CMS-HCC) F33.1   12/1/2016 - Present    Abdominal pain R10.9 Medium  1/25/2017 - Present    Biliary tract imaging abnormality R93.2 High  1/25/2017 - Present    Choledocholithiasis with obstruction K80.51 High  1/25/2017 - Present      Health Maintenance        Date Due Completion Dates    IMM DTaP/Tdap/Td Vaccine (1 - Tdap) 1/11/1960 ---    IMM ZOSTER VACCINE 1/11/2001 ---    IMM PNEUMOCOCCAL 65+ (ADULT) LOW/MEDIUM RISK SERIES (2 of 2 - PPSV23) 1/14/2016 1/14/2015    COLONOSCOPY 8/31/2020 8/31/2015, 1/29/2013            Current Immunizations     13-VALENT PCV PREVNAR 1/14/2015    Influenza TIV (IM) 9/1/2012    Influenza Vaccine 9/1/2011, 10/1/2010    Influenza Vaccine Adult HD 9/15/2016    Influenza Vaccine Quad Inj (Preserved) 10/17/2014    Pneumococcal Vaccine (UF)Historical Data 10/1/2010      Below and/or attached are the medications your provider expects you to take. Review all of your home medications and newly ordered medications with your provider and/or pharmacist. Follow medication instructions as directed by your provider and/or pharmacist. Please keep your medication list with you and share with your provider. Update the information when  medications are discontinued, doses are changed, or new medications (including over-the-counter products) are added; and carry medication information at all times in the event of emergency situations     Allergies:  OTHER ENVIRONMENTAL - (reactions not documented)               Medications  Valid as of: May 05, 2017 -  1:58 PM    Generic Name Brand Name Tablet Size Instructions for use    Aspirin (Chew Tab) ASA 81 MG Take 1 Tab by mouth every day.        Cholecalciferol (Cap) Vitamin D 2000 UNITS Take  by mouth.        Citalopram Hydrobromide (Tab) CELEXA 10 MG Take 1 Tab by mouth every day.        Cyanocobalamin (Tab) VITAMIN B-12 500 MCG Take 500 mcg by mouth 2 Times a Day.        Finasteride (Tab) PROSCAR 5 MG Take 1 Tab by mouth every day.        Lisinopril (Tab) PRINIVIL 20 MG Take 1 tablet by mouth  every day        Metoprolol Succinate (TABLET SR 24 HR) TOPROL XL 50 MG Take 1 Tab by mouth every day. Dr khan to refill        Multiple Vitamins-Minerals (Tab) THERAGRAN-M  Take 1 Tab by mouth every day.        Omega-3 Fatty Acids (Cap) OMEGA 3 FA 1000 MG Take 1 Cap by mouth 3 times a day, with meals.        Omeprazole (CAPSULE DELAYED RELEASE) PRILOSEC 20 MG Take 1 capsule by mouth  twice daily        Pravastatin Sodium (Tab) PRAVACHOL 40 MG Take 1 tablet by mouth  daily        Warfarin Sodium (Tab) COUMADIN 5 MG Take 1 and 1/2 tablets by mouth once daily or as directed by coumadin clinic        .                 Medicines prescribed today were sent to:     [facility] - [address] [facility] [address]    Phone: [phone] Fax: [phone]    Open 24 Hours?: No    [id number] - [place], [place] - [address]    [address] [place] [address]    Phone: [phone] Fax: [phone]    Open 24 Hours?: No      Medication refill instructions:       If your prescription bottle indicates you have medication refills left, it is not  necessary to call your provider’s office. Please contact your pharmacy and they will refill your medication.    If your prescription bottle indicates you do not have any refills left, you may request refills at any time through one of the following ways: The online [employer] system (except Urgent Care), by calling your provider’s office, or by asking your pharmacy to contact your provider’s office with a refill request. Medication refills are processed only during regular business hours and may not be available until the next business day. Your provider may request additional information or to have a follow-up visit with you prior to refilling your medication.   *Please Note: Medication refills are assigned a new Rx number when refilled electronically. Your pharmacy may indicate that no refills were authorized even though a new prescription for the same medication is available at the pharmacy. Please request the medicine by name with the pharmacy before contacting your provider for a refill.        Your To Do List     Future Labs/Procedures Complete By Expires    DX-HIP-COMPLETE - UNILATERAL 2+ RIGHT  As directed 5/19/2017      Referral     A referral request has been sent to our patient care coordination department. Please allow 3-5 business days for us to process this request and contact you either by phone or mail. If you do not hear from us by the 5th business day, please call us at [phone].           [facility] Status: Patient Declined

## 2017-05-10 RX ORDER — OMEPRAZOLE 20 MG/1
CAPSULE, DELAYED RELEASE ORAL
Qty: 90 CAP | Refills: 0 | Status: SHIPPED | OUTPATIENT
Start: 2017-05-10 | End: 2017-06-30 | Stop reason: SDUPTHER

## 2017-05-10 RX ORDER — LISINOPRIL 20 MG/1
TABLET ORAL
Qty: 90 TAB | Refills: 4 | Status: SHIPPED | OUTPATIENT
Start: 2017-05-10

## 2017-05-10 RX ORDER — CITALOPRAM HYDROBROMIDE 10 MG/1
TABLET ORAL
Qty: 90 TAB | Refills: 0 | Status: SHIPPED | OUTPATIENT
Start: 2017-05-10 | End: 2017-07-27

## 2017-05-12 ENCOUNTER — ANTICOAGULATION VISIT (OUTPATIENT)
Dept: MEDICAL GROUP | Facility: PHYSICIAN GROUP | Age: 77
End: 2017-05-12
Payer: MEDICARE

## 2017-05-12 DIAGNOSIS — I48.91 ATRIAL FIBRILLATION, UNSPECIFIED TYPE (HCC): ICD-10-CM

## 2017-05-12 LAB — INR PPP: 3.2 (ref 2–3.5)

## 2017-05-12 PROCEDURE — 99999 PR NO CHARGE: CPT | Performed by: NURSE PRACTITIONER

## 2017-05-12 PROCEDURE — 85610 PROTHROMBIN TIME: CPT | Performed by: NURSE PRACTITIONER

## 2017-05-12 PROCEDURE — 4040F PNEUMOC VAC/ADMIN/RCVD: CPT | Performed by: NURSE PRACTITIONER

## 2017-05-12 PROCEDURE — G8419 CALC BMI OUT NRM PARAM NOF/U: HCPCS | Performed by: NURSE PRACTITIONER

## 2017-05-12 RX ORDER — FINASTERIDE 5 MG/1
5 TABLET, FILM COATED ORAL DAILY
Qty: 90 TAB | Refills: 4 | Status: SHIPPED | OUTPATIENT
Start: 2017-05-12

## 2017-05-12 NOTE — TELEPHONE ENCOUNTER
Was the patient seen in the last year in this department? Yes     Does patient have an active prescription for medications requested? Yes  RX NEEDS TO BE SENT TO [facility].  PHARMACY UPDATED.    Received Request Via: Pharmacy

## 2017-05-12 NOTE — MR AVS SNAPSHOT
"        Isaiah Coronel   2017 8:00 AM   Anticoagulation Visit   MRN: [id number]    Department:  [facility]   Dept Phone:  [phone]    Description:  Male : 1940   Provider:  Kelvin Guy PHARMD           Allergies as of 2017     Allergen Noted Reactions    Other Environmental 2012       \"My nose and eyes are bothered when I walk outside.\"      You were diagnosed with     Atrial fibrillation, unspecified type (CMS-HCC)   [3904131]         Vital Signs     Smoking Status                   Former Smoker           Basic Information     Date Of Birth Sex Race Ethnicity Preferred Language    1940 Male White Non- English      Your appointments     May 12, 2017  8:00 AM   Anti-Coag Routine with Kelvin Guy PHARMD   [address] [id number] [phone]            2017 10:40 AM   Established Patient with Avery Nelson M.D.   [address] [id number] [phone]           You will be receiving a confirmation call a few days before your appointment from our automated call confirmation system.            2017  8:15 AM   Anti-Coag Routine with [place] PHARMACIST   [address] [id number] [phone]              Problem List              ICD-10-CM Priority Class Noted - Resolved    Essential hypertension I10   2011 - Present    COPD (chronic obstructive pulmonary disease) (CMS-HCC) J44.9   3/9/2012 - Present    S/P colonoscopy ; NIA 5 YR- at [facility] Z98.890   3/9/2012 - Present    Left ventricular systolic dysfunction-EF=45-50% ECHO - normal cardiac cath 2014- dr khan at [facility] I51.9   2012 - Present    Mixed hyperlipidemia E78.2   2012 - Present    Vitamin D deficiency disease E55.9   2013 - Present    Achalasia of esophagus- " fundoplication at [facility] 2014- GIC K22.0   2/26/2014 - Present    Gastroesophageal reflux disease with esophagitis- fundoplication at [facility] 2014 K21.0   4/9/2015 - Present    BPH (benign prostatic hyperplasia)- neg cysto 2014 N40.0   4/9/2015 - Present    Atrial fibrillation (HCC)- dr khan I48.91   10/14/2015 - Present    Anticoagulated on warfarin- a fib, dr rhodes Z79.01   11/24/2015 - Present    Right hip pain- dr hughes M25.551   11/24/2015 - Present    Chronic low back pain- dr hughes M54.5, G89.29   5/27/2016 - Present    Moderate episode of recurrent major depressive disorder (CMS-HCC) F33.1   12/1/2016 - Present    Abdominal pain R10.9 Medium  1/25/2017 - Present    Biliary tract imaging abnormality R93.2 High  1/25/2017 - Present    Choledocholithiasis with obstruction K80.51 High  1/25/2017 - Present      Health Maintenance        Date Due Completion Dates    IMM DTaP/Tdap/Td Vaccine (1 - Tdap) 1/11/1960 ---    IMM ZOSTER VACCINE 1/11/2001 ---    IMM PNEUMOCOCCAL 65+ (ADULT) LOW/MEDIUM RISK SERIES (2 of 2 - PPSV23) 1/14/2016 1/14/2015    COLONOSCOPY 8/31/2020 8/31/2015, 1/29/2013            Results     POCT Protime      Component    INR    3.2    Comment:     [id number] 3/2018 ic valid                        Current Immunizations     13-VALENT PCV PREVNAR 1/14/2015    Influenza TIV (IM) 9/1/2012    Influenza Vaccine 9/1/2011, 10/1/2010    Influenza Vaccine Adult HD 9/15/2016    Influenza Vaccine Quad Inj (Preserved) 10/17/2014    Pneumococcal Vaccine (UF)Historical Data 10/1/2010      Below and/or attached are the medications your provider expects you to take. Review all of your home medications and newly ordered medications with your provider and/or pharmacist. Follow medication instructions as directed by your provider and/or pharmacist. Please keep your medication list with you and share with your provider. Update the information when medications are discontinued, doses are changed, or new medications  (including over-the-counter products) are added; and carry medication information at all times in the event of emergency situations     Allergies:  OTHER ENVIRONMENTAL - (reactions not documented)               Medications  Valid as of: May 12, 2017 -  7:50 AM    Generic Name Brand Name Tablet Size Instructions for use    Aspirin (Chew Tab) ASA 81 MG Take 1 Tab by mouth every day.        Cholecalciferol (Cap) Vitamin D 2000 UNITS Take  by mouth.        Citalopram Hydrobromide (Tab) CELEXA 10 MG Take 1 tablet by mouth  every day        Cyanocobalamin (Tab) VITAMIN B-12 500 MCG Take 500 mcg by mouth 2 Times a Day.        Finasteride (Tab) PROSCAR 5 MG Take 1 Tab by mouth every day.        Lisinopril (Tab) PRINIVIL 20 MG Take 1 tablet by mouth  every day        Metoprolol Succinate (TABLET SR 24 HR) TOPROL XL 50 MG Take 1 Tab by mouth every day. Dr khan to refill        Multiple Vitamins-Minerals (Tab) THERAGRAN-M  Take 1 Tab by mouth every day.        Omega-3 Fatty Acids (Cap) OMEGA 3 FA 1000 MG Take 1 Cap by mouth 3 times a day, with meals.        Omeprazole (CAPSULE DELAYED RELEASE) PRILOSEC 20 MG Take 1 capsule by mouth  twice daily        Pravastatin Sodium (Tab) PRAVACHOL 40 MG Take 1 tablet by mouth  daily        Warfarin Sodium (Tab) COUMADIN 5 MG Take 1 and 1/2 tablets by mouth once daily or as directed by coumadin clinic        .                 Medicines prescribed today were sent to:     [facility] - [address] [facility] [address]    Phone: [phone] Fax: [phone]    Open 24 Hours?: No    [facility] [place] - [address]    [address] [facility] [place] [address]    Phone: [phone] Fax: [phone]    Open 24 Hours?: No      Medication refill instructions:       If your prescription bottle indicates you have medication refills left, it is not necessary to call your provider’s office. Please contact your  pharmacy and they will refill your medication.    If your prescription bottle indicates you do not have any refills left, you may request refills at any time through one of the following ways: The online [employer] system (except Urgent Care), by calling your provider’s office, or by asking your pharmacy to contact your provider’s office with a refill request. Medication refills are processed only during regular business hours and may not be available until the next business day. Your provider may request additional information or to have a follow-up visit with you prior to refilling your medication.   *Please Note: Medication refills are assigned a new Rx number when refilled electronically. Your pharmacy may indicate that no refills were authorized even though a new prescription for the same medication is available at the pharmacy. Please request the medicine by name with the pharmacy before contacting your provider for a refill.        Warfarin Dosing Calendar   May 2017 Details    Sun Mon Tue Wed Thu Fri Sat      1               2               3               4               5               6                 7               8               9               10               11               12   3.2   2.5 mg   See details      13      7.5 mg           14      7.5 mg         15      7.5 mg         16      5 mg         17      7.5 mg         18      5 mg         19      7.5 mg         20      7.5 mg           21      7.5 mg         22      7.5 mg         23      5 mg         24      7.5 mg         25      5 mg         26      7.5 mg         27      7.5 mg           28      7.5 mg         29      7.5 mg         30      5 mg         31      7.5 mg             Date Details   05/12 This INR check   INR: 3.2   [id number] 3/2018 ic valid               How to take your warfarin dose     To take:  2.5 mg Take 0.5 of a 5 mg tablet.    To take:  5 mg Take 1 of the 5 mg tablets.    To take:  7.5 mg Take 1.5 of the 5 mg tablets.            Warfarin Dosing Calendar   June 2017 Details    Sun Mon Tue Wed Thu Fri Sat         1      5 mg         2      7.5 mg         3      7.5 mg           4      7.5 mg         5      7.5 mg         6      5 mg         7      7.5 mg         8      5 mg         9      7.5 mg         10                 11               12               13               14               15               16               17                 18               19               20               21               22               23               24                 25               26               27               28               29               30                 Date Details   No additional details    Date of next INR:  6/9/2017         How to take your warfarin dose     To take:  5 mg Take 1 of the 5 mg tablets.    To take:  7.5 mg Take 1.5 of the 5 mg tablets.              MyChart Status: Patient Declined

## 2017-05-12 NOTE — PROGRESS NOTES
Anticoagulation Summary as of 5/12/2017     INR goal 2.0-3.0   Selected INR 3.2! (5/12/2017)   Maintenance plan 5 mg (5 mg x 1) on Tue, Thu; 7.5 mg (5 mg x 1.5) all other days   Weekly total 47.5 mg   Plan last modified [facility] (6/23/2016)   Next INR check 6/9/2017   Target end date Indefinite    Indications   A-fib- chronic warfarin rx (Resolved) [I48.91]  Atrial fibrillation (HCC)- dr khan [I48.91]         Anticoagulation Episode Summary     INR check location [facility]    Preferred lab     Send INR reminders to     Comments       Anticoagulation Care Providers     Provider Role Specialty Phone number    Duncan Tierney M.D. Referring Family Medicine [phone]    Alonso Khan M.D.  Cardiology [phone]    Mikhail Camacho, PHARMD Responsible          Anticoagulation Patient Findings   Negatives Missed Doses, Extra Doses, Medication Changes, Antibiotic Use, Diet Changes, Dental/Other Procedures, Hospitalization, Bleeding Gums, Nose Bleeds, Blood in Urine, Blood in Stool, Any Bruising, Other Complaints        Patient is supratherapeutic today with INR of 3.2.  Pt denies any unusual s/s of bleeding, bruising, clotting or any changes to diet or medications.  Instructed patient to take 2.5mg X 1, then continue with current warfarin dosing regimen.  Patient declines BP/vitals check today.  Follow up in 4 weeks.    Kelvin Guy, BCD

## 2017-05-25 ENCOUNTER — HOSPITAL ENCOUNTER (OUTPATIENT)
Dept: LAB | Facility: MEDICAL CENTER | Age: 77
End: 2017-05-25
Attending: INTERNAL MEDICINE
Payer: MEDICARE

## 2017-05-25 DIAGNOSIS — E78.2 MIXED HYPERLIPIDEMIA: ICD-10-CM

## 2017-05-25 LAB
ALBUMIN SERPL BCP-MCNC: 3.5 G/DL (ref 3.2–4.9)
ALBUMIN/GLOB SERPL: 1.2 G/DL
ALP SERPL-CCNC: 63 U/L (ref 30–99)
ALT SERPL-CCNC: 11 U/L (ref 2–50)
ANION GAP SERPL CALC-SCNC: 10 MMOL/L (ref 0–11.9)
AST SERPL-CCNC: 17 U/L (ref 12–45)
BASOPHILS # BLD AUTO: 0.9 % (ref 0–1.8)
BASOPHILS # BLD: 0.07 K/UL (ref 0–0.12)
BILIRUB SERPL-MCNC: 1.4 MG/DL (ref 0.1–1.5)
BUN SERPL-MCNC: 22 MG/DL (ref 8–22)
CALCIUM SERPL-MCNC: 9.1 MG/DL (ref 8.5–10.5)
CHLORIDE SERPL-SCNC: 104 MMOL/L (ref 96–112)
CHOLEST SERPL-MCNC: 148 MG/DL (ref 100–199)
CO2 SERPL-SCNC: 26 MMOL/L (ref 20–33)
CREAT SERPL-MCNC: 0.83 MG/DL (ref 0.5–1.4)
EOSINOPHIL # BLD AUTO: 0.08 K/UL (ref 0–0.51)
EOSINOPHIL NFR BLD: 1.1 % (ref 0–6.9)
ERYTHROCYTE [DISTWIDTH] IN BLOOD BY AUTOMATED COUNT: 50 FL (ref 35.9–50)
GFR SERPL CREATININE-BSD FRML MDRD: >60 ML/MIN/1.73 M 2
GLOBULIN SER CALC-MCNC: 3 G/DL (ref 1.9–3.5)
GLUCOSE SERPL-MCNC: 68 MG/DL (ref 65–99)
HCT VFR BLD AUTO: 49.1 % (ref 42–52)
HDLC SERPL-MCNC: 51 MG/DL
HGB BLD-MCNC: 16.5 G/DL (ref 14–18)
IMM GRANULOCYTES # BLD AUTO: 0.02 K/UL (ref 0–0.11)
IMM GRANULOCYTES NFR BLD AUTO: 0.3 % (ref 0–0.9)
LDLC SERPL CALC-MCNC: 87 MG/DL
LYMPHOCYTES # BLD AUTO: 1.59 K/UL (ref 1–4.8)
LYMPHOCYTES NFR BLD: 21.2 % (ref 22–41)
MCH RBC QN AUTO: 33.3 PG (ref 27–33)
MCHC RBC AUTO-ENTMCNC: 33.6 G/DL (ref 33.7–35.3)
MCV RBC AUTO: 99.2 FL (ref 81.4–97.8)
MONOCYTES # BLD AUTO: 0.7 K/UL (ref 0–0.85)
MONOCYTES NFR BLD AUTO: 9.3 % (ref 0–13.4)
NEUTROPHILS # BLD AUTO: 5.04 K/UL (ref 1.82–7.42)
NEUTROPHILS NFR BLD: 67.2 % (ref 44–72)
NRBC # BLD AUTO: 0 K/UL
NRBC BLD AUTO-RTO: 0 /100 WBC
PLATELET # BLD AUTO: 173 K/UL (ref 164–446)
PMV BLD AUTO: 11.6 FL (ref 9–12.9)
POTASSIUM SERPL-SCNC: 4.2 MMOL/L (ref 3.6–5.5)
PROT SERPL-MCNC: 6.5 G/DL (ref 6–8.2)
RBC # BLD AUTO: 4.95 M/UL (ref 4.7–6.1)
SODIUM SERPL-SCNC: 140 MMOL/L (ref 135–145)
TRIGL SERPL-MCNC: 52 MG/DL (ref 0–149)
WBC # BLD AUTO: 7.5 K/UL (ref 4.8–10.8)

## 2017-05-25 PROCEDURE — 85025 COMPLETE CBC W/AUTO DIFF WBC: CPT

## 2017-05-25 PROCEDURE — 80061 LIPID PANEL: CPT

## 2017-05-25 PROCEDURE — 80053 COMPREHEN METABOLIC PANEL: CPT

## 2017-05-25 PROCEDURE — 36415 COLL VENOUS BLD VENIPUNCTURE: CPT

## 2017-05-31 ENCOUNTER — TELEPHONE (OUTPATIENT)
Dept: MEDICAL GROUP | Age: 77
End: 2017-05-31

## 2017-05-31 NOTE — TELEPHONE ENCOUNTER
ESTABLISHED PATIENT PRE-VISIT PLANNING     Note: Patient will not be contacted if there is no indication to call.     1.  Reviewed note from last office visit with PCP and/or other med group provider: Yes    2.  If any orders were placed at last visit, do we have Results/Consult Notes?        •  Labs - Labs ordered, completed and results are in chart.       •  Imaging - Imaging was not ordered at last office visit.       •  Referrals - No referrals were ordered at last office visit.    3.  Immunizations were updated in [facility] using WebIZ?: Yes       •  Web Iz Recommendations: HEPATITIS A  PNEUMOVAX (PPSV23) TDAP ZOSTAVAX (Shingles)    4.  Patient is due for the following Health Maintenance Topics:   Health Maintenance Due   Topic Date Due   • Annual Wellness Visit  1940   • PFT SCREENING-FEV1 AND FEV/FVC RATIO / SPIROMETRY SHOULD BE PERFORMED ANNUALLY  01/11/1959   • IMM DTaP/Tdap/Td Vaccine (1 - Tdap) 01/11/1960   • IMM ZOSTER VACCINE  01/11/2001   • IMM PNEUMOCOCCAL 65+ (ADULT) LOW/MEDIUM RISK SERIES (2 of 2 - PPSV23) 01/14/2016       - Patient has completed FLU and PREVNAR (PCV13)  Immunization(s) per WebIZ. Chart has been updated.    5.  Patient was not informed to arrive 15 min prior to their scheduled appointment and bring in their medication bottles.

## 2017-06-01 ENCOUNTER — OFFICE VISIT (OUTPATIENT)
Dept: MEDICAL GROUP | Age: 77
End: 2017-06-01
Payer: MEDICARE

## 2017-06-01 VITALS
BODY MASS INDEX: 23.91 KG/M2 | DIASTOLIC BLOOD PRESSURE: 78 MMHG | SYSTOLIC BLOOD PRESSURE: 138 MMHG | TEMPERATURE: 97.6 F | HEART RATE: 79 BPM | WEIGHT: 180.4 LBS | HEIGHT: 73 IN | OXYGEN SATURATION: 93 %

## 2017-06-01 DIAGNOSIS — Z79.01 ANTICOAGULATED ON WARFARIN: ICD-10-CM

## 2017-06-01 DIAGNOSIS — M85.88 OSTEOPENIA OF SPINE: ICD-10-CM

## 2017-06-01 DIAGNOSIS — I10 ESSENTIAL HYPERTENSION: ICD-10-CM

## 2017-06-01 DIAGNOSIS — N40.0 BENIGN NODULAR PROSTATIC HYPERPLASIA WITHOUT LOWER URINARY TRACT SYMPTOMS: ICD-10-CM

## 2017-06-01 DIAGNOSIS — G89.29 CHRONIC LOW BACK PAIN WITH SCIATICA, SCIATICA LATERALITY UNSPECIFIED, UNSPECIFIED BACK PAIN LATERALITY: ICD-10-CM

## 2017-06-01 DIAGNOSIS — E78.2 MIXED HYPERLIPIDEMIA: ICD-10-CM

## 2017-06-01 DIAGNOSIS — K21.00 GASTROESOPHAGEAL REFLUX DISEASE WITH ESOPHAGITIS: ICD-10-CM

## 2017-06-01 DIAGNOSIS — E55.9 VITAMIN D DEFICIENCY DISEASE: ICD-10-CM

## 2017-06-01 DIAGNOSIS — K22.0 ACHALASIA OF ESOPHAGUS: ICD-10-CM

## 2017-06-01 DIAGNOSIS — I51.9 LEFT VENTRICULAR SYSTOLIC DYSFUNCTION: ICD-10-CM

## 2017-06-01 DIAGNOSIS — M81.8 OSTEOPOROSIS OF DISUSE: ICD-10-CM

## 2017-06-01 DIAGNOSIS — M54.40 CHRONIC LOW BACK PAIN WITH SCIATICA, SCIATICA LATERALITY UNSPECIFIED, UNSPECIFIED BACK PAIN LATERALITY: ICD-10-CM

## 2017-06-01 DIAGNOSIS — I48.20 CHRONIC ATRIAL FIBRILLATION (HCC): ICD-10-CM

## 2017-06-01 DIAGNOSIS — F33.1 MODERATE EPISODE OF RECURRENT MAJOR DEPRESSIVE DISORDER (HCC): ICD-10-CM

## 2017-06-01 DIAGNOSIS — R93.2 BILIARY TRACT IMAGING ABNORMALITY: ICD-10-CM

## 2017-06-01 DIAGNOSIS — J43.1 PANLOBULAR EMPHYSEMA (HCC): ICD-10-CM

## 2017-06-01 PROBLEM — K80.51 CHOLEDOCHOLITHIASIS WITH OBSTRUCTION: Status: RESOLVED | Noted: 2017-01-25 | Resolved: 2017-06-01

## 2017-06-01 PROBLEM — R10.9 ABDOMINAL PAIN: Status: RESOLVED | Noted: 2017-01-25 | Resolved: 2017-06-01

## 2017-06-01 PROBLEM — M85.89 OSTEOPENIA OF MULTIPLE SITES: Status: ACTIVE | Noted: 2017-06-01

## 2017-06-01 PROCEDURE — 1036F TOBACCO NON-USER: CPT | Performed by: INTERNAL MEDICINE

## 2017-06-01 PROCEDURE — 99214 OFFICE O/P EST MOD 30 MIN: CPT | Performed by: INTERNAL MEDICINE

## 2017-06-01 PROCEDURE — 4040F PNEUMOC VAC/ADMIN/RCVD: CPT | Performed by: INTERNAL MEDICINE

## 2017-06-01 PROCEDURE — 1101F PT FALLS ASSESS-DOCD LE1/YR: CPT | Performed by: INTERNAL MEDICINE

## 2017-06-01 PROCEDURE — G8420 CALC BMI NORM PARAMETERS: HCPCS | Performed by: INTERNAL MEDICINE

## 2017-06-01 PROCEDURE — G8432 DEP SCR NOT DOC, RNG: HCPCS | Performed by: INTERNAL MEDICINE

## 2017-06-01 ASSESSMENT — ENCOUNTER SYMPTOMS
EYES NEGATIVE: 1
NEUROLOGICAL NEGATIVE: 1
CONSTITUTIONAL NEGATIVE: 1
RESPIRATORY NEGATIVE: 1
PSYCHIATRIC NEGATIVE: 1
MUSCULOSKELETAL NEGATIVE: 1
GASTROINTESTINAL NEGATIVE: 1
CARDIOVASCULAR NEGATIVE: 1

## 2017-06-01 ASSESSMENT — PATIENT HEALTH QUESTIONNAIRE - PHQ9: CLINICAL INTERPRETATION OF PHQ2 SCORE: 0

## 2017-06-01 NOTE — MR AVS SNAPSHOT
"        Isaiah STORM Homer   2017 10:40 AM   Office Visit   MRN: [id number]    Department:  [address] [place] [address]   Dept Phone:  [phone]    Description:  Male : 1940   Provider:  Avery Nelson M.D.           Reason for Visit     Hyperlipidemia lab review    Vitamin D Deficiency           Allergies as of 2017     Allergen Noted Reactions    Other Environmental 2012       \"My nose and eyes are bothered when I walk outside.\"      You were diagnosed with     Osteoporosis of disuse   [423429]       Essential hypertension   [8979134]       Panlobular emphysema (CMS-HCC)   [326496]       Biliary tract imaging abnormality   [234022]       Left ventricular systolic dysfunction   [798154]       Mixed hyperlipidemia   [272.2.ICD-9-CM]       Gastroesophageal reflux disease with esophagitis   [5267771]       Chronic atrial fibrillation (CMS-HCC)   [263651]       Anticoagulated on warfarin   [957697]       Moderate episode of recurrent major depressive disorder (CMS-HCC)   [5120110]       Benign nodular prostatic hyperplasia without lower urinary tract symptoms   [0760718]       Achalasia of esophagus   [453819]       Vitamin D deficiency disease   [511935]       Chronic low back pain with sciatica, sciatica laterality unspecified, unspecified back pain laterality   [3279275]         Vital Signs     Blood Pressure Pulse Temperature Height Weight Body Mass Index    138/78 mmHg 79 36.4 °C (97.6 °F) 1.854 m (6' 0.99\") 81.829 kg (180 lb 6.4 oz) 23.81 kg/m2    Oxygen Saturation Smoking Status                93% Former Smoker          Basic Information     Date Of Birth Sex Race Ethnicity Preferred Language    1940 Male White Non- English      Your appointments     2017  8:15 AM   Anti-Coag Routine with [place] PHARMACIST   [facility] [place] ([address] [address] [phone]            Dec 01, 2017 10:00 AM   Established Patient with " Avery Nelson M.D.   [facility] [address] ([facility])    [address] [id number] [phone]           You will be receiving a confirmation call a few days before your appointment from our automated call confirmation system.              Problem List              ICD-10-CM Priority Class Noted - Resolved    Essential hypertension I10   4/5/2011 - Present    COPD (chronic obstructive pulmonary disease) (CMS-HCC) J44.9   3/9/2012 - Present    S/P colonoscopy 2013; NIA 5 YR- at [facility] Z98.890   3/9/2012 - Present    Left ventricular systolic dysfunction-EF=45-50% ECHO 2012- normal cardiac cath 2014- dr khan at [facility] I51.9   6/19/2012 - Present    Mixed hyperlipidemia E78.2   6/19/2012 - Present    Vitamin D deficiency disease E55.9   12/2/2013 - Present    Achalasia of esophagus- fundoplication at [facility] 2014- [facility] K22.0   2/26/2014 - Present    Gastroesophageal reflux disease with esophagitis- fundoplication at [facility] 2014 K21.0   4/9/2015 - Present    BPH (benign prostatic hyperplasia)- neg cysto 2014 N40.0   4/9/2015 - Present    Atrial fibrillation (HCC)- dr khan I48.91   10/14/2015 - Present    Anticoagulated on warfarin- a fib, dr rhodes Z79.01   11/24/2015 - Present    Chronic low back pain- dr hughes M54.5, G89.29   5/27/2016 - Present    Moderate episode of recurrent major depressive disorder (CMS-HCC) F33.1   12/1/2016 - Present    Biliary tract imaging abnormality- dr sotelo; sp biliary sphincterotomy jan 2017 R93.2 High  1/25/2017 - Present      Health Maintenance        Date Due Completion Dates    IMM DTaP/Tdap/Td Vaccine (1 - Tdap) 1/11/1960 ---    IMM ZOSTER VACCINE 1/11/2001 ---    IMM PNEUMOCOCCAL 65+ (ADULT) LOW/MEDIUM RISK SERIES (2 of 2 - PPSV23) 1/14/2016 1/14/2015    COLONOSCOPY 8/31/2020 8/31/2015, 1/29/2013            Current Immunizations     13-VALENT PCV PREVNAR 1/14/2015    Influenza TIV (IM) 9/1/2012    Influenza Vaccine 9/1/2011, 10/1/2010    Influenza Vaccine Adult  HD 9/15/2016    Influenza Vaccine Quad Inj (Preserved) 10/17/2014    Pneumococcal Vaccine (UF)Historical Data 10/1/2010      Below and/or attached are the medications your provider expects you to take. Review all of your home medications and newly ordered medications with your provider and/or pharmacist. Follow medication instructions as directed by your provider and/or pharmacist. Please keep your medication list with you and share with your provider. Update the information when medications are discontinued, doses are changed, or new medications (including over-the-counter products) are added; and carry medication information at all times in the event of emergency situations     Allergies:  OTHER ENVIRONMENTAL - (reactions not documented)               Medications  Valid as of: June 01, 2017 - 11:01 AM    Generic Name Brand Name Tablet Size Instructions for use    Aspirin (Chew Tab) ASA 81 MG Take 1 Tab by mouth every day.        Cholecalciferol (Cap) Vitamin D 2000 UNITS Take  by mouth.        Citalopram Hydrobromide (Tab) CELEXA 10 MG Take 1 tablet by mouth  every day        Cyanocobalamin (Tab) VITAMIN B-12 500 MCG Take 500 mcg by mouth 2 Times a Day.        Finasteride (Tab) PROSCAR 5 MG Take 1 Tab by mouth every day.        Lisinopril (Tab) PRINIVIL 20 MG Take 1 tablet by mouth  every day        Metoprolol Succinate (TABLET SR 24 HR) TOPROL XL 50 MG Take 1 Tab by mouth every day. Dr khan to refill        Multiple Vitamins-Minerals (Tab) THERAGRAN-M  Take 1 Tab by mouth every day.        Omega-3 Fatty Acids (Cap) OMEGA 3 FA 1000 MG Take 1 Cap by mouth 3 times a day, with meals.        Omeprazole (CAPSULE DELAYED RELEASE) PRILOSEC 20 MG Take 1 capsule by mouth  twice daily        Pravastatin Sodium (Tab) PRAVACHOL 40 MG Take 1 tablet by mouth  daily        Warfarin Sodium (Tab) COUMADIN 5 MG Take 1 and 1/2 tablets by mouth once daily or as directed by coumadin clinic        .                 Medicines  prescribed today were sent to:     [facility] - [place] [id number] [facility]    [address] [place] [address]    Phone: [phone] Fax: [phone]    Open 24 Hours?: No    [id number] - [place], [place] - [address]    [address] [place] [address]    Phone: [phone] Fax: [phone]    Open 24 Hours?: No      Medication refill instructions:       If your prescription bottle indicates you have medication refills left, it is not necessary to call your provider’s office. Please contact your pharmacy and they will refill your medication.    If your prescription bottle indicates you do not have any refills left, you may request refills at any time through one of the following ways: The online eSoft system (except Urgent Care), by calling your provider’s office, or by asking your pharmacy to contact your provider’s office with a refill request. Medication refills are processed only during regular business hours and may not be available until the next business day. Your provider may request additional information or to have a follow-up visit with you prior to refilling your medication.   *Please Note: Medication refills are assigned a new Rx number when refilled electronically. Your pharmacy may indicate that no refills were authorized even though a new prescription for the same medication is available at the pharmacy. Please request the medicine by name with the pharmacy before contacting your provider for a refill.        Your To Do List     Future Labs/Procedures Complete By Expires    CBC WITH DIFFERENTIAL  As directed 6/1/2018    COMP METABOLIC PANEL  As directed 6/1/2018    DS-BONE DENSITY STUDY (DEXA)  As directed 6/1/2018    LIPID PROFILE  As directed 6/1/2018    TSH  As directed 6/1/2018         eSoft Status: Patient Declined

## 2017-06-01 NOTE — PROGRESS NOTES
"Subjective:      Isaiah Coronel is a 77 y.o. male who presents with Hyperlipidemia and Vitamin D Deficiency  The patient is here for followup of chronic medical problems listed below. The patient is compliant with medications and having no side effects from them. Denies chest pain, abdominal pain, dyspnea, myalgias, or cough.   Patient Active Problem List    Diagnosis Date Noted   • Biliary tract imaging abnormality- dr sotelo; sp biliary sphincterotomy jan 2017 01/25/2017     Priority: High   • Moderate episode of recurrent major depressive disorder (CMS-HCC) 12/01/2016   • Chronic low back pain- dr hughes 05/27/2016   • Anticoagulated on warfarin- [facility], dr rhodes 11/24/2015   • Atrial fibrillation (HCC)- dr khan 10/14/2015   • Gastroesophageal reflux disease with esophagitis- fundoplication at [facility] 2014 04/09/2015   • BPH (benign prostatic hyperplasia)- neg cysto 2014 04/09/2015   • Achalasia of esophagus- fundoplication at [facility] 2014- [facility] 02/26/2014   • Vitamin D deficiency disease 12/02/2013   • Left ventricular systolic dysfunction-EF=45-50% ECHO 2012- normal cardiac cath 2014- dr khan at [facility] 06/19/2012   • Mixed hyperlipidemia 06/19/2012   • COPD (chronic obstructive pulmonary disease) (CMS-HCC) 03/09/2012   • S/P colonoscopy 2013; NIA 5 YR- at [facility] 03/09/2012   • Essential hypertension 04/05/2011     Allergies   Allergen Reactions   • Other Environmental      \"My nose and eyes are bothered when I walk outside.\"     Outpatient Prescriptions Prior to Visit   Medication Sig Dispense Refill   • finasteride (PROSCAR) 5 MG Tab Take 1 Tab by mouth every day. 90 Tab 4   • citalopram (CELEXA) 10 MG tablet Take 1 tablet by mouth  every day 90 Tab 0   • omeprazole (PRILOSEC) 20 MG delayed-release capsule Take 1 capsule by mouth  twice daily 90 Cap 0   • lisinopril (PRINIVIL) 20 MG Tab Take 1 tablet by mouth  every day 90 Tab 4   • warfarin (COUMADIN) 5 MG Tab Take 1 and 1/2 tablets by mouth once daily or as directed by " "coumadin clinic 135 Tab 1   • therapeutic multivitamin-minerals (THERAGRAN-M) Tab Take 1 Tab by mouth every day.     • pravastatin (PRAVACHOL) 40 MG tablet Take 1 tablet by mouth  daily 90 Tab 4   • Cholecalciferol (VITAMIN D) 2000 UNITS Cap Take  by mouth. 30 Cap    • metoprolol SR (TOPROL XL) 50 MG TABLET SR 24 HR Take 1 Tab by mouth every day. Dr khan to refill 90 Tab 1   • aspirin (ASA) 81 MG Chew Tab chewable tablet Take 1 Tab by mouth every day. 100 Tab    • docosahexanoic acid (OMEGA 3 FA) 1000 MG CAPS Take 1 Cap by mouth 3 times a day, with meals. 90 Cap 11   • cyanocobalamin (VITAMIN B-12) 500 MCG TABS Take 500 mcg by mouth 2 Times a Day.       No facility-administered medications prior to visit.               HPI    Review of Systems   Constitutional: Negative.    HENT: Negative.    Eyes: Negative.    Respiratory: Negative.    Cardiovascular: Negative.    Gastrointestinal: Negative.    Genitourinary: Negative.    Musculoskeletal: Negative.    Skin: Negative.    Neurological: Negative.    Endo/Heme/Allergies: Negative.    Psychiatric/Behavioral: Negative.           Objective:     /78 mmHg  Pulse 79  Temp(Src) 36.4 °C (97.6 °F)  Ht 1.854 m (6' 0.99\")  Wt 81.829 kg (180 lb 6.4 oz)  BMI 23.81 kg/m2  SpO2 93%     Physical Exam   Constitutional: He is oriented to person, place, and time. He appears well-developed and well-nourished. No distress.   HENT:   Head: Normocephalic and atraumatic.   Right Ear: External ear normal.   Left Ear: External ear normal.   Mouth/Throat: Oropharynx is clear and moist. No oropharyngeal exudate.   Eyes: Conjunctivae and EOM are normal. Pupils are equal, round, and reactive to light. Right eye exhibits no discharge.   Neck: Normal range of motion. Neck supple. No JVD present. No tracheal deviation present. No thyromegaly present.   Cardiovascular: Normal rate, regular rhythm, normal heart sounds and intact distal pulses.  Exam reveals no gallop.    Pulmonary/Chest: " Effort normal and breath sounds normal. No respiratory distress. He has no wheezes. He has no rales. He exhibits no tenderness.   Abdominal: Soft. Bowel sounds are normal. He exhibits no distension and no mass. There is no tenderness. There is no rebound and no guarding. No hernia.   Genitourinary: Guaiac negative stool. No penile tenderness.   Musculoskeletal: He exhibits no edema or tenderness.   Lymphadenopathy:     He has no cervical adenopathy.   Neurological: He is alert and oriented to person, place, and time. He has normal reflexes. He displays normal reflexes. No cranial nerve deficit. He exhibits normal muscle tone. Coordination normal.   Skin: Skin is warm and dry. No rash noted. He is not diaphoretic. No erythema. No pallor.   Psychiatric: He has a normal mood and affect. His behavior is normal. Judgment and thought content normal.   Nursing note and vitals reviewed.    Hospital Outpatient Visit on 05/25/2017   Component Date Value   • Sodium 05/25/2017 140    • Potassium 05/25/2017 4.2    • Chloride 05/25/2017 104    • Co2 05/25/2017 26    • Anion Gap 05/25/2017 10.0    • Glucose 05/25/2017 68    • Bun 05/25/2017 22    • Creatinine 05/25/2017 0.83    • Calcium 05/25/2017 9.1    • AST(SGOT) 05/25/2017 17    • ALT(SGPT) 05/25/2017 11    • Alkaline Phosphatase 05/25/2017 63    • Total Bilirubin 05/25/2017 1.4    • Albumin 05/25/2017 3.5    • Total Protein 05/25/2017 6.5    • Globulin 05/25/2017 3.0    • A-G Ratio 05/25/2017 1.2    • Cholesterol,Tot 05/25/2017 148    • Triglycerides 05/25/2017 52    • HDL 05/25/2017 51    • LDL 05/25/2017 87    • WBC 05/25/2017 7.5    • RBC 05/25/2017 4.95    • Hemoglobin 05/25/2017 16.5    • Hematocrit 05/25/2017 49.1    • MCV 05/25/2017 99.2*   • MCH 05/25/2017 33.3*   • MCHC 05/25/2017 33.6*   • RDW 05/25/2017 50.0    • Platelet Count 05/25/2017 173    • MPV 05/25/2017 11.6    • Neutrophils-Polys 05/25/2017 67.20    • Lymphocytes 05/25/2017 21.20*   • Monocytes  05/25/2017 9.30    • Eosinophils 05/25/2017 1.10    • Basophils 05/25/2017 0.90    • Immature Granulocytes 05/25/2017 0.30    • Nucleated RBC 05/25/2017 0.00    • Neutrophils (Absolute) 05/25/2017 5.04    • Lymphs (Absolute) 05/25/2017 1.59    • Monos (Absolute) 05/25/2017 0.70    • Eos (Absolute) 05/25/2017 0.08    • Baso (Absolute) 05/25/2017 0.07    • Immature Granulocytes (a* 05/25/2017 0.02    • NRBC (Absolute) 05/25/2017 0.00    • GFR If  05/25/2017 >60    • GFR If Non  [employer]* 05/25/2017 >60    Anticoagulation Visit on 05/12/2017   Component Date Value   • INR 05/12/2017 3.2       No results found for: HBA1C  Lab Results   Component Value Date/Time    SODIUM 140 05/25/2017 07:04 AM    POTASSIUM 4.2 05/25/2017 07:04 AM    CHLORIDE 104 05/25/2017 07:04 AM    CO2 26 05/25/2017 07:04 AM    GLUCOSE 68 05/25/2017 07:04 AM    BUN 22 05/25/2017 07:04 AM    CREATININE 0.83 05/25/2017 07:04 AM    ALKALINE PHOSPHATASE 63 05/25/2017 07:04 AM    AST(SGOT) 17 05/25/2017 07:04 AM    ALT(SGPT) 11 05/25/2017 07:04 AM    TOTAL BILIRUBIN 1.4 05/25/2017 07:04 AM     Lab Results   Component Value Date/Time    INR 3.2 05/12/2017 07:48 AM    INR 2.8 03/31/2017 07:51 AM    INR 2.7 03/03/2017 07:43 AM     Lab Results   Component Value Date/Time    CHOLESTEROL, 05/25/2017 07:04 AM    LDL 87 05/25/2017 07:04 AM    HDL 51 05/25/2017 07:04 AM    TRIGLYCERIDES 52 05/25/2017 07:04 AM       Lab Results   Component Value Date/Time    TESTOSTERONE,TOTAL 445 06/13/2012 08:46 AM     No results found for: TSH  Lab Results   Component Value Date/Time    FREE T-4 1.14 03/31/2014 10:33 AM    FREE T-4 0.91 03/30/2012 09:19 AM     Lab Results   Component Value Date/Time    URIC ACID 5.9 03/30/2012 09:19 AM     No components found for: VITB12  Lab Results   Component Value Date/Time    25-HYDROXY   VITAMIN D 25 44 05/19/2016 09:52 AM    25-HYDROXY   VITAMIN D 25 67 10/02/2014 07:15 AM                   Assessment/Plan:      1. Osteoporosis of disuse      - DS-BONE DENSITY STUDY (DEXA); Future    2. Essential hypertension        Under good control. Continue same regimen.    3. Panlobular emphysema (CMS-HCC)     Under good control. Continue same regimen.  4. Biliary tract imaging abnormality- dr sotelo; sp biliary sphincterotomy jan 2017 Under good control. Continue same regimen.       5. Left ventricular systolic dysfunction-EF=45-50% ECHO 2012- normal cardiac cath 2014- dr khan at [facility]       6. Mixed hyperlipidemia Under good control. Continue same regimen.      - TSH; Future  - COMP METABOLIC PANEL; Future  - LIPID PROFILE; Future  - CBC WITH DIFFERENTIAL; Future    7. Gastroesophageal reflux disease with esophagitis- fundoplication at [facility] 2014 Under good control. Continue same regimen.       8. Chronic atrial fibrillation (CMS-HCC) Under good control. Continue same regimen.        9. Anticoagulated on warfarin- a fib, dr rhodes Under good control. Continue same regimen.        10. Moderate episode of recurrent major depressive disorder (CMS-HCC) Under good control. Continue same regimen.       11. Benign nodular prostatic hyperplasia without lower urinary tract symptoms Under good control. Continue same regimen.         12. Achalasia of esophagus- fundoplication at [facility] 2014- [facility] Under good control. Continue same regimen.     13. Vitamin D deficiency disease Under good control. Continue same regimen.        14. Chronic low back pain with sciatica, sciatica laterality unspecified, unspecified back pain laterality Under good control. Continue same regimen.     30 minute face-to-face encounter took place today.  More than half of this time was spent in the coordination of care of the above problems, as well as counseling.

## 2017-06-16 ENCOUNTER — ANTICOAGULATION VISIT (OUTPATIENT)
Dept: MEDICAL GROUP | Facility: PHYSICIAN GROUP | Age: 77
End: 2017-06-16
Payer: MEDICARE

## 2017-06-16 VITALS — HEART RATE: 40 BPM | DIASTOLIC BLOOD PRESSURE: 88 MMHG | SYSTOLIC BLOOD PRESSURE: 124 MMHG

## 2017-06-16 DIAGNOSIS — I48.20 CHRONIC ATRIAL FIBRILLATION (HCC): ICD-10-CM

## 2017-06-16 LAB — INR PPP: 3.3 (ref 2–3.5)

## 2017-06-16 PROCEDURE — 85610 PROTHROMBIN TIME: CPT | Performed by: FAMILY MEDICINE

## 2017-06-16 PROCEDURE — 99999 PR NO CHARGE: CPT | Performed by: FAMILY MEDICINE

## 2017-06-16 NOTE — MR AVS SNAPSHOT
"        Isaiah Coronel   2017 8:15 AM   Anticoagulation Visit   MRN: [id number]    Department:  [facility]   Dept Phone:  [phone]    Description:  Male : 1940   Provider:  Kelvin Guy PHARMD           Allergies as of 2017     Allergen Noted Reactions    Other Environmental 2012       \"My nose and eyes are bothered when I walk outside.\"      You were diagnosed with     Chronic atrial fibrillation (CMS-[facility])   [005404]         Vital Signs     Blood Pressure Pulse Smoking Status             124/88 mmHg 40 Former Smoker         Basic Information     Date Of Birth Sex Race Ethnicity Preferred Language    1940 Male White Non- English      Your appointments     2017  8:15 AM   Anti-Coag Routine with Kelvin Guy PHARMD   [facility] ([place])    [address] [place] [id number] [phone]            2017  9:45 AM   BONE DENSITY (DEXASCAN) with KIN MORRIS BD 1   IMAGING [facility]    [facility] [place]  [address]  [address]  [facility] [id number] [phone]           No calcium supplements 24 hours prior to exam, including antacids or multivitamins w/calcium.  Pt may eat and drink normally.  No injection procedures prior to Dexa on the same day.  No barium contrast, no CTs with IV or oral contrast, no Pet/CTs and no Nuc Med injections for 7 days prior to a Dexa (including Barium Swallow, Upper GI, Small Bowel Series).  If scheduling a Dexa on the same day as a CT with IV or oral contrast, any test with barium, Pet/CT or a Nuc Med with injection, always schedule the Dexa before the other study.            2017  8:15 AM   Anti-Coag Routine with [place] PHARMACIST   [facility]    [address] [id number] [phone]            Dec 01, 2017 10:00 AM   Established Patient with Avery Nelson M.D.   [facility] 25 " [facility])    [address]  [facility] [id number] [phone]           You will be receiving a confirmation call a few days before your appointment from our automated call confirmation system.              Problem List              ICD-10-CM Priority Class Noted - Resolved    Essential hypertension I10   4/5/2011 - Present    COPD (chronic obstructive pulmonary disease) (CMS-HCC) J44.9   3/9/2012 - Present    S/P colonoscopy 2013; NIA 5 YR- at [facility] Z98.890   3/9/2012 - Present    Left ventricular systolic dysfunction-EF=45-50% ECHO 2012- normal cardiac cath 2014- dr khan at [facility] I51.9   6/19/2012 - Present    Mixed hyperlipidemia E78.2   6/19/2012 - Present    Vitamin D deficiency disease E55.9   12/2/2013 - Present    Achalasia of esophagus- fundoplication at [facility] 2014- [facility] K22.0   2/26/2014 - Present    Gastroesophageal reflux disease with esophagitis- fundoplication at [facility] 2014 K21.0   4/9/2015 - Present    BPH (benign prostatic hyperplasia)- neg cysto 2014 N40.0   4/9/2015 - Present    Atrial fibrillation (HCC)- dr khan I48.91   10/14/2015 - Present    Anticoagulated on warfarin- a fib, dr rhodes Z79.01   11/24/2015 - Present    Chronic low back pain- dr hughes M54.5, G89.29   5/27/2016 - Present    Moderate episode of recurrent major depressive disorder (CMS-HCC) F33.1   12/1/2016 - Present    Biliary tract imaging abnormality- dr sotelo; sp biliary sphincterotomy jan 2017 R93.2 High  1/25/2017 - Present    Osteopenia of spine-DEXA scan 2014 M85.88   6/1/2017 - Present      Health Maintenance        Date Due Completion Dates    IMM DTaP/Tdap/Td Vaccine (1 - Tdap) 1/11/1960 ---    IMM ZOSTER VACCINE 1/11/2001 ---    IMM PNEUMOCOCCAL 65+ (ADULT) LOW/MEDIUM RISK SERIES (2 of 2 - PPSV23) 1/14/2016 1/14/2015    COLONOSCOPY 8/31/2020 8/31/2015, 1/29/2013            Results     POCT Protime      Component    INR    3.3    Comment:     [id number] 3/2018 ic valid                        Current  Immunizations     13-VALENT PCV PREVNAR 1/14/2015    Influenza TIV (IM) 9/1/2012    Influenza Vaccine 9/1/2011, 10/1/2010    Influenza Vaccine Adult HD 9/15/2016    Influenza Vaccine Quad Inj (Preserved) 10/17/2014    Pneumococcal Vaccine (UF)Historical Data 10/1/2010      Below and/or attached are the medications your provider expects you to take. Review all of your home medications and newly ordered medications with your provider and/or pharmacist. Follow medication instructions as directed by your provider and/or pharmacist. Please keep your medication list with you and share with your provider. Update the information when medications are discontinued, doses are changed, or new medications (including over-the-counter products) are added; and carry medication information at all times in the event of emergency situations     Allergies:  OTHER ENVIRONMENTAL - (reactions not documented)               Medications  Valid as of: June 16, 2017 -  7:58 AM    Generic Name Brand Name Tablet Size Instructions for use    Aspirin (Chew Tab) ASA 81 MG Take 1 Tab by mouth every day.        Cholecalciferol (Cap) Vitamin D 2000 UNITS Take  by mouth.        Citalopram Hydrobromide (Tab) CELEXA 10 MG Take 1 tablet by mouth  every day        Cyanocobalamin (Tab) VITAMIN B-12 500 MCG Take 500 mcg by mouth 2 Times a Day.        Finasteride (Tab) PROSCAR 5 MG Take 1 Tab by mouth every day.        Lisinopril (Tab) PRINIVIL 20 MG Take 1 tablet by mouth  every day        Metoprolol Succinate (TABLET SR 24 HR) TOPROL XL 50 MG Take 1 Tab by mouth every day. Dr khan to refill        Multiple Vitamins-Minerals (Tab) THERAGRAN-M  Take 1 Tab by mouth every day.        Omega-3 Fatty Acids (Cap) OMEGA 3 FA 1000 MG Take 1 Cap by mouth 3 times a day, with meals.        Omeprazole (CAPSULE DELAYED RELEASE) PRILOSEC 20 MG Take 1 capsule by mouth  twice daily        Pravastatin Sodium (Tab) PRAVACHOL 40 MG Take 1 tablet by mouth  daily         Warfarin Sodium (Tab) COUMADIN 5 MG Take 1 and 1/2 tablets by mouth once daily or as directed by coumadin clinic        .                 Medicines prescribed today were sent to:     [facility] - [address]    [address] [facility] [address]    Phone: [phone] Fax: [phone]    Open 24 Hours?: No    [id number] - [place], [place] - [address]    [address] [place] [address]    Phone: [phone] Fax: [phone]    Open 24 Hours?: No      Medication refill instructions:       If your prescription bottle indicates you have medication refills left, it is not necessary to call your provider’s office. Please contact your pharmacy and they will refill your medication.    If your prescription bottle indicates you do not have any refills left, you may request refills at any time through one of the following ways: The online [employer] system (except Urgent Care), by calling your provider’s office, or by asking your pharmacy to contact your provider’s office with a refill request. Medication refills are processed only during regular business hours and may not be available until the next business day. Your provider may request additional information or to have a follow-up visit with you prior to refilling your medication.   *Please Note: Medication refills are assigned a new Rx number when refilled electronically. Your pharmacy may indicate that no refills were authorized even though a new prescription for the same medication is available at the pharmacy. Please request the medicine by name with the pharmacy before contacting your provider for a refill.        Warfarin Dosing Calendar   June 2017 Details    Sun Mon Tue Wed Thu Fri Sat         1               2               3                 4               5               6               7               8               9               10                 11               12               13                 14               15               16   3.3   5 mg   See details      17      7.5 mg           18      7.5 mg         19      5 mg         20      7.5 mg         21      5 mg         22      7.5 mg         23      5 mg         24      7.5 mg           25      7.5 mg         26      5 mg         27      7.5 mg         28      5 mg         29      7.5 mg         30      5 mg           Date Details   06/16 This INR check   INR: 3.3   [id number] 3/2018 ic valid       Date of next INR:  6/30/2017         How to take your warfarin dose     To take:  5 mg Take 1 of the 5 mg tablets.    To take:  7.5 mg Take 1.5 of the 5 mg tablets.              MyChart Status: Patient Declined

## 2017-06-16 NOTE — PROGRESS NOTES
"Anticoagulation Summary as of 6/16/2017     INR goal 2.0-3.0   Selected INR 3.3! (6/16/2017)   Maintenance plan 5 mg (5 mg x 1) on Mon, Wed, Fri; 7.5 mg (5 mg x 1.5) all other days   Weekly total 45 mg   Plan last modified Kelvin Guy PHARMD (6/16/2017)   Next INR check 6/30/2017   Target end date Indefinite    Indications   A-fib- chronic warfarin rx (Resolved) [I48.91]  Atrial fibrillation (HCC)- dr khan [I48.91]         Anticoagulation Episode Summary     [facility] check location [facility]    Preferred lab     Send INR reminders to     Comments       Anticoagulation Care Providers     Provider Role Specialty Phone number    Duncan Tierney M.D. Referring Family Medicine [phone]    Alonso Khan M.D.  Cardiology [phone]    Mikhail Camacho, PHARMD Responsible          Anticoagulation Patient Findings   Positives Bleeding Gums    Negatives Missed Doses, Extra Doses, Medication Changes, Antibiotic Use, Diet Changes, Dental/Other Procedures, Hospitalization, Nose Bleeds, Blood in Urine, Blood in Stool, Any Bruising, Other Complaints        Patient is supratherapeutic today with INR of 3.3.  Pt denies any unusual s/s of bleeding, bruising, clotting or any changes to diet or medications.  Had some gum bleeding ~two weeks ago and visited ER as a precaution, said INR was \"high\".   Instructed patient to decrease weekly warfarin regimen by ~5% as detailed above.  Instructed patient   Follow up in 2 weeks.    Kelvin Guy, MILO        "

## 2017-06-19 ENCOUNTER — HOSPITAL ENCOUNTER (OUTPATIENT)
Dept: RADIOLOGY | Facility: MEDICAL CENTER | Age: 77
End: 2017-06-19
Attending: INTERNAL MEDICINE
Payer: MEDICARE

## 2017-06-19 DIAGNOSIS — M81.8 OSTEOPOROSIS OF DISUSE: ICD-10-CM

## 2017-06-19 PROCEDURE — 77080 DXA BONE DENSITY AXIAL: CPT

## 2017-06-30 ENCOUNTER — ANTICOAGULATION VISIT (OUTPATIENT)
Dept: MEDICAL GROUP | Facility: PHYSICIAN GROUP | Age: 77
End: 2017-06-30
Payer: MEDICARE

## 2017-06-30 VITALS — DIASTOLIC BLOOD PRESSURE: 77 MMHG | HEART RATE: 37 BPM | SYSTOLIC BLOOD PRESSURE: 132 MMHG

## 2017-06-30 DIAGNOSIS — I48.20 CHRONIC ATRIAL FIBRILLATION (HCC): ICD-10-CM

## 2017-06-30 LAB — INR PPP: 2.5 (ref 2–3.5)

## 2017-06-30 PROCEDURE — 85610 PROTHROMBIN TIME: CPT | Performed by: FAMILY MEDICINE

## 2017-06-30 PROCEDURE — 99999 PR NO CHARGE: CPT | Performed by: FAMILY MEDICINE

## 2017-06-30 NOTE — PROGRESS NOTES
Anticoagulation Summary as of 6/30/2017     INR goal 2.0-3.0   Selected INR 2.5 (6/30/2017)   Maintenance plan 5 mg (5 mg x 1) on Mon, Wed, Fri; 7.5 mg (5 mg x 1.5) all other days   Weekly total 45 mg   Plan last modified Kelvin Guy PHARMD (6/16/2017)   Next INR check 7/21/2017   Target end date Indefinite    Indications   A-fib- chronic warfarin rx (Resolved) [I48.91]  Atrial fibrillation (HCC)- dr khan [I48.91]         Anticoagulation Episode Summary     [facility]    Preferred lab     Send INR reminders to     Comments       Anticoagulation Care Providers     Provider Role Specialty Phone number    Duncan Tierney M.D. Referring Family Medicine [phone]    Alonso Khan M.D.  Cardiology [phone]    Mikhail Camacho, PHARMD Responsible          Anticoagulation Patient Findings   Negatives Missed Doses, Extra Doses, Medication Changes, Antibiotic Use, Diet Changes, Dental/Other Procedures, Hospitalization, Bleeding Gums, Nose Bleeds, Blood in Urine, Blood in Stool, Any Bruising, Other Complaints        Patient is therapeutic today with INR of 2.5.  Pt denies any unusual s/s of bleeding, bruising, clotting or any changes to diet or medications.  Pt is to continue with current warfarin dosing regimen.  Follow up in 3 weeks.    Kelvin Guy, BCD

## 2017-07-03 RX ORDER — OMEPRAZOLE 20 MG/1
CAPSULE, DELAYED RELEASE ORAL
Qty: 90 CAP | Refills: 4 | Status: SHIPPED | OUTPATIENT
Start: 2017-07-03 | End: 2017-10-13 | Stop reason: SDUPTHER

## 2017-07-13 ENCOUNTER — TELEPHONE (OUTPATIENT)
Dept: MEDICAL GROUP | Age: 77
End: 2017-07-13

## 2017-07-13 NOTE — TELEPHONE ENCOUNTER
ESTABLISHED PATIENT PRE-VISIT PLANNING     Note: Patient will not be contacted if there is no indication to call.     1.  Reviewed notes from the last few office visits within the medical group: Yes    2.  If any orders were placed at last visit or intended to be done for this visit (i.e. 6 mos follow-up), do we have Results/Consult Notes?        •  Labs - labs for OV in Dec       •  Imaging - Imaging was not ordered at last office visit.       •  Referrals - No referrals were ordered at last office visit.    3. Is this appointment scheduled as a Hospital Follow-Up? No    4.  Immunizations were updated in Epic using WebIZ?: Epic matches WebIZ       •  Web [employer] Recommendations: FLU, HEPATITIS A , PNEUMOVAX (PPSV23), TDAP and ZOSTAVAX (Shingles)    5.  Patient is due for the following Health Maintenance Topics:   Health Maintenance Due   Topic Date Due   • Annual Wellness Visit  1940   • PFT SCREENING-FEV1 AND FEV/FVC RATIO / SPIROMETRY SHOULD BE PERFORMED ANNUALLY  01/11/1959   • IMM DTaP/Tdap/Td Vaccine (1 - Tdap) 01/11/1960   • IMM ZOSTER VACCINE  01/11/2001   • IMM PNEUMOCOCCAL 65+ (ADULT) LOW/MEDIUM RISK SERIES (2 of 2 - PPSV23) 01/14/2016           6.  Patient was NOT informed to arrive 15 min prior to their scheduled appointment and bring in their medication bottles.

## 2017-07-21 ENCOUNTER — ANTICOAGULATION VISIT (OUTPATIENT)
Dept: MEDICAL GROUP | Facility: PHYSICIAN GROUP | Age: 77
End: 2017-07-21
Payer: MEDICARE

## 2017-07-21 DIAGNOSIS — I48.20 CHRONIC ATRIAL FIBRILLATION (HCC): ICD-10-CM

## 2017-07-21 LAB — INR PPP: 2.1 (ref 2–3.5)

## 2017-07-21 PROCEDURE — 85610 PROTHROMBIN TIME: CPT | Performed by: FAMILY MEDICINE

## 2017-07-21 NOTE — PROGRESS NOTES
Anticoagulation Summary as of 7/21/2017     INR goal 2.0-3.0   Selected INR 2.1 (7/21/2017)   Maintenance plan 5 mg (5 mg x 1) on Mon, Wed, Fri; 7.5 mg (5 mg x 1.5) all other days   Weekly total 45 mg   Plan last modified Kelvin Guy, PHARMD (6/16/2017)   Next INR check 8/28/2017   Target end date Indefinite    Indications   A-fib- chronic warfarin rx (Resolved) [I48.91]  Atrial fibrillation (HCC)- dr khan [I48.91]         Anticoagulation Episode Summary     [facility] location [facility]    Preferred lab     Send INR reminders to     Comments       Anticoagulation Care Providers     Provider Role Specialty Phone number    Duncan Tierney M.D. Referring Family Medicine [phone]    Alonso Khan M.D.  Cardiology [phone]    Mikhail Camacho, PHARMD Responsible          Anticoagulation Patient Findings   Negatives Missed Doses, Extra Doses, Medication Changes, Antibiotic Use, Diet Changes, Dental/Other Procedures, Hospitalization, Bleeding Gums, Nose Bleeds, Blood in Urine, Blood in Stool, Any Bruising, Other Complaints        Patient is therapeutic today with INR of 2.1.  Pt denies any unusual s/s of bleeding, bruising, clotting or any changes to diet or medications.  Pt is to continue with current warfarin dosing regimen.  Patient declines BP/vitals check today.  Follow up in 5 weeks per patient availability.    Kelvin Guy, BCD

## 2017-07-21 NOTE — MR AVS SNAPSHOT
"        Isaiah Coronel   2017 8:15 AM   Anticoagulation Visit   MRN: [id number]    Department:  [facility]   Dept Phone:  [phone]    Description:  Male : 1940   Provider:  Kelvin Guy, BCD           Allergies as of 2017     Allergen Noted Reactions    Other Environmental 2012       \"My nose and eyes are bothered when I walk outside.\"      You were diagnosed with     Chronic atrial fibrillation (CMS-HCC)   [145795]         Vital Signs     Smoking Status                   Former Smoker           Basic Information     Date Of Birth Sex Race Ethnicity Preferred Language    1940 Male White Non- English      Your appointments     Aug 28, 2017  8:15 AM   Anti-Coag Routine with [place] PHARMACIST   [address] [id number] [phone]            Dec 01, 2017 10:00 AM   Established Patient with Avery Nelson M.D.   [address]    [address] [facility] [id number] [phone]           You will be receiving a confirmation call a few days before your appointment from our automated call confirmation system.              Problem List              ICD-10-CM Priority Class Noted - Resolved    Essential hypertension I10   2011 - Present    COPD (chronic obstructive pulmonary disease) (CMS-HCC) J44.9   3/9/2012 - Present    S/P colonoscopy ; NIA 5 YR- at [facility] Z98.890   3/9/2012 - Present    Left ventricular systolic dysfunction-EF=45-50% ECHO - normal cardiac cath - dr khan at [facility] I51.9   2012 - Present    Mixed hyperlipidemia E78.2   2012 - Present    Vitamin D deficiency disease E55.9   2013 - Present    Achalasia of esophagus- fundoplication at [facility] K22.0   2014 - Present    Gastroesophageal reflux disease with esophagitis- fundoplication at [facility]  K21.0   2015 - Present    BPH (benign prostatic hyperplasia)- " neg cysto 2014 N40.0   4/9/2015 - Present    Atrial fibrillation (HCC)- dr khan I48.91   10/14/2015 - Present    Anticoagulated on warfarin- [place], dr rhodes Z79.01   11/24/2015 - Present    Chronic low back pain- dr hughes M54.5, G89.29   5/27/2016 - Present    Moderate episode of recurrent major depressive disorder (CMS-HCC) F33.1   12/1/2016 - Present    Biliary tract imaging abnormality- dr sotelo; sp biliary sphincterotomy jan 2017 R93.2 High  1/25/2017 - Present    Osteopenia of spine-DEXA scan 2014 M85.88   6/1/2017 - Present      Health Maintenance        Date Due Completion Dates    IMM DTaP/Tdap/Td Vaccine (1 - Tdap) 1/11/1960 ---    IMM ZOSTER VACCINE 1/11/2001 ---    IMM PNEUMOCOCCAL 65+ (ADULT) LOW/MEDIUM RISK SERIES (2 of 2 - PPSV23) 1/14/2016 1/14/2015    IMM INFLUENZA (1) 9/1/2017 9/15/2016, 10/17/2014, 9/1/2012    COLONOSCOPY 8/31/2020 8/31/2015, 1/29/2013            Results     POCT Protime      Component    INR    2.1    Comment:     [id number] 5/2018 ic valid                        Current Immunizations     13-VALENT PCV PREVNAR 1/14/2015    Influenza TIV (IM) 9/1/2012    Influenza Vaccine 9/1/2011, 10/1/2010    Influenza Vaccine Adult HD 9/15/2016    Influenza Vaccine Quad Inj (Preserved) 10/17/2014    Pneumococcal Vaccine (UF)Historical Data 10/1/2010      Below and/or attached are the medications your provider expects you to take. Review all of your home medications and newly ordered medications with your provider and/or pharmacist. Follow medication instructions as directed by your provider and/or pharmacist. Please keep your medication list with you and share with your provider. Update the information when medications are discontinued, doses are changed, or new medications (including over-the-counter products) are added; and carry medication information at all times in the event of emergency situations     Allergies:  OTHER ENVIRONMENTAL - (reactions not documented)               Medications   Valid as of: July 21, 2017 -  8:24 AM    Generic Name Brand Name Tablet Size Instructions for use    Aspirin (Chew Tab) ASA 81 MG Take 1 Tab by mouth every day.        Cholecalciferol (Cap) Vitamin D 2000 UNITS Take  by mouth.        Citalopram Hydrobromide (Tab) CELEXA 10 MG Take 1 tablet by mouth  every day        Cyanocobalamin (Tab) VITAMIN B-12 500 MCG Take 500 mcg by mouth 2 Times a Day.        Finasteride (Tab) PROSCAR 5 MG Take 1 Tab by mouth every day.        Lisinopril (Tab) PRINIVIL 20 MG Take 1 tablet by mouth  every day        Metoprolol Succinate (TABLET SR 24 HR) TOPROL XL 50 MG Take 1 Tab by mouth every day. Dr khan to refill        Multiple Vitamins-Minerals (Tab) THERAGRAN-M  Take 1 Tab by mouth every day.        Omega-3 Fatty Acids (Cap) OMEGA 3 FA 1000 MG Take 1 Cap by mouth 3 times a day, with meals.        Omeprazole (CAPSULE DELAYED RELEASE) PRILOSEC 20 MG Take 1 capsule by mouth  twice daily        Pravastatin Sodium (Tab) PRAVACHOL 40 MG Take 1 tablet by mouth  daily        Warfarin Sodium (Tab) COUMADIN 5 MG Take 1 and 1/2 tablets by mouth once daily or as directed by coumadin clinic        .                 Medicines prescribed today were sent to:     [facility] - [address] [facility] [address]    Phone: [phone] Fax: [phone]    Open 24 Hours?: No    [id number] [place], [place] - [address] [facility]    [address] [place] [address]    Phone: [phone] Fax: [phone]    Open 24 Hours?: No      Medication refill instructions:       If your prescription bottle indicates you have medication refills left, it is not necessary to call your provider’s office. Please contact your pharmacy and they will refill your medication.    If your prescription bottle indicates you do not have any refills left, you may request refills at any time through one of the following ways: The online [employer] system (except  Urgent Care), by calling your provider’s office, or by asking your pharmacy to contact your provider’s office with a refill request. Medication refills are processed only during regular business hours and may not be available until the next business day. Your provider may request additional information or to have a follow-up visit with you prior to refilling your medication.   *Please Note: Medication refills are assigned a new Rx number when refilled electronically. Your pharmacy may indicate that no refills were authorized even though a new prescription for the same medication is available at the pharmacy. Please request the medicine by name with the pharmacy before contacting your provider for a refill.        Warfarin Dosing Calendar   July 2017 Details    Sun Mon Tue Wed Thu Fri Sat           1                 2               3               4               5               6               7               8                 9               10               11               12               13               14               15                 16               17               18               19               20               21   2.1   5 mg   See details      22      7.5 mg           23      7.5 mg         24      5 mg         25      7.5 mg         26      5 mg         27      7.5 mg         28      5 mg         29      7.5 mg           30      7.5 mg         31      5 mg               Date Details   07/21 This INR check   INR: 2.1   [id number] 5/2018 ic valid               How to take your warfarin dose     To take:  5 mg Take 1 of the 5 mg tablets.    To take:  7.5 mg Take 1.5 of the 5 mg tablets.           Warfarin Dosing Calendar   August 2017 Details    Sun Mon Tue Wed Thu Fri Sat       1      7.5 mg         2      5 mg         3      7.5 mg         4      5 mg         5      7.5 mg           6      7.5 mg         7      5 mg         8      7.5 mg         9      5 mg         10      7.5 mg         11      5  mg         12      7.5 mg           13      7.5 mg         14      5 mg         15      7.5 mg         16      5 mg         17      7.5 mg         18      5 mg         19      7.5 mg           20      7.5 mg         21      5 mg         22      7.5 mg         23      5 mg         24      7.5 mg         25      5 mg         26      7.5 mg           27      7.5 mg         28      5 mg         29               30               31                  Date Details   No additional details    Date of next INR:  8/28/2017         How to take your warfarin dose     To take:  5 mg Take 1 of the 5 mg tablets.    To take:  7.5 mg Take 1.5 of the 5 mg tablets.              MyChart Status: Patient Declined

## 2017-07-26 ENCOUNTER — TELEPHONE (OUTPATIENT)
Dept: MEDICAL GROUP | Age: 77
End: 2017-07-26

## 2017-07-26 NOTE — TELEPHONE ENCOUNTER
ESTABLISHED PATIENT PRE-VISIT PLANNING     Note: Patient will not be contacted if there is no indication to call.     1.  Reviewed notes from the last few office visits within the medical group: Yes    2.  If any orders were placed at last visit or intended to be done for this visit (i.e. 6 mos follow-up), do we have Results/Consult Notes?        •  Labs - Patient has appt in Dec to go over labs       •  Imaging - Imaging was not ordered at last office visit.       •  Referrals - No referrals were ordered at last office visit.    3. Is this appointment scheduled as a Hospital Follow-Up? No    4.  Immunizations were updated in Epic using WebIZ?: Epic matches WebIZ       •  Web Iz Recommendations: FLU, HEPATITIS A , PNEUMOVAX (PPSV23), TDAP and ZOSTAVAX (Shingles)    5.  Patient is due for the following Health Maintenance Topics:   Health Maintenance Due   Topic Date Due   • Annual Wellness Visit  1940   • PFT SCREENING-FEV1 AND FEV/FVC RATIO / SPIROMETRY SHOULD BE PERFORMED ANNUALLY  01/11/1959   • IMM DTaP/Tdap/Td Vaccine (1 - Tdap) 01/11/1960   • IMM ZOSTER VACCINE  01/11/2001   • IMM PNEUMOCOCCAL 65+ (ADULT) LOW/MEDIUM RISK SERIES (2 of 2 - PPSV23) 01/14/2016           6.  Patient was NOT informed to arrive 15 min prior to their scheduled appointment and bring in their medication bottles.

## 2017-07-27 ENCOUNTER — OFFICE VISIT (OUTPATIENT)
Dept: MEDICAL GROUP | Age: 77
End: 2017-07-27
Payer: MEDICARE

## 2017-07-27 VITALS
DIASTOLIC BLOOD PRESSURE: 70 MMHG | WEIGHT: 189 LBS | BODY MASS INDEX: 25.05 KG/M2 | TEMPERATURE: 97.9 F | SYSTOLIC BLOOD PRESSURE: 126 MMHG | OXYGEN SATURATION: 97 % | HEIGHT: 73 IN | HEART RATE: 56 BPM

## 2017-07-27 DIAGNOSIS — R93.2 BILIARY TRACT IMAGING ABNORMALITY: ICD-10-CM

## 2017-07-27 DIAGNOSIS — I51.9 LEFT VENTRICULAR SYSTOLIC DYSFUNCTION: ICD-10-CM

## 2017-07-27 DIAGNOSIS — F33.1 MODERATE EPISODE OF RECURRENT MAJOR DEPRESSIVE DISORDER (HCC): ICD-10-CM

## 2017-07-27 DIAGNOSIS — G89.29 CHRONIC LOW BACK PAIN WITH SCIATICA, SCIATICA LATERALITY UNSPECIFIED, UNSPECIFIED BACK PAIN LATERALITY: ICD-10-CM

## 2017-07-27 DIAGNOSIS — I10 ESSENTIAL HYPERTENSION: ICD-10-CM

## 2017-07-27 DIAGNOSIS — E78.2 MIXED HYPERLIPIDEMIA: ICD-10-CM

## 2017-07-27 DIAGNOSIS — J43.1 PANLOBULAR EMPHYSEMA (HCC): ICD-10-CM

## 2017-07-27 DIAGNOSIS — E55.9 VITAMIN D DEFICIENCY: ICD-10-CM

## 2017-07-27 DIAGNOSIS — N40.1 BENIGN NON-NODULAR PROSTATIC HYPERPLASIA WITH LOWER URINARY TRACT SYMPTOMS: ICD-10-CM

## 2017-07-27 DIAGNOSIS — M85.88 OSTEOPENIA OF SPINE: ICD-10-CM

## 2017-07-27 DIAGNOSIS — Z79.01 ANTICOAGULATED ON WARFARIN: ICD-10-CM

## 2017-07-27 DIAGNOSIS — E55.9 VITAMIN D DEFICIENCY DISEASE: ICD-10-CM

## 2017-07-27 DIAGNOSIS — K22.0 ACHALASIA OF ESOPHAGUS: ICD-10-CM

## 2017-07-27 DIAGNOSIS — K21.00 GASTROESOPHAGEAL REFLUX DISEASE WITH ESOPHAGITIS: ICD-10-CM

## 2017-07-27 DIAGNOSIS — M54.40 CHRONIC LOW BACK PAIN WITH SCIATICA, SCIATICA LATERALITY UNSPECIFIED, UNSPECIFIED BACK PAIN LATERALITY: ICD-10-CM

## 2017-07-27 DIAGNOSIS — I48.20 CHRONIC ATRIAL FIBRILLATION (HCC): ICD-10-CM

## 2017-07-27 PROCEDURE — 99215 OFFICE O/P EST HI 40 MIN: CPT | Performed by: INTERNAL MEDICINE

## 2017-07-27 RX ORDER — CITALOPRAM 20 MG/1
20 TABLET ORAL DAILY
Qty: 90 TAB | Refills: 4 | Status: SHIPPED | OUTPATIENT
Start: 2017-07-27

## 2017-07-27 ASSESSMENT — ENCOUNTER SYMPTOMS
MUSCULOSKELETAL NEGATIVE: 1
EYES NEGATIVE: 1
NEUROLOGICAL NEGATIVE: 1
CONSTITUTIONAL NEGATIVE: 1
GASTROINTESTINAL NEGATIVE: 1
PSYCHIATRIC NEGATIVE: 1
CARDIOVASCULAR NEGATIVE: 1
RESPIRATORY NEGATIVE: 1

## 2017-07-27 NOTE — MR AVS SNAPSHOT
"        Isaiah Coronel   2017 4:20 PM   Office Visit   MRN: [id number]    Department:  [address] [place] [address]   Dept Phone:  [phone]    Description:  Male : 1940   Provider:  Avery Nelson M.D.           Reason for Visit     Depression would like to increase the dosage on Citalopram       Allergies as of 2017     Allergen Noted Reactions    Other Environmental 2012       \"My nose and eyes are bothered when I walk outside.\"      You were diagnosed with     Moderate episode of recurrent major depressive disorder (CMS-[facility])   [0555559]       Essential hypertension   [0445113]       Panlobular emphysema (CMS-[facility])   [116156]       Left ventricular systolic dysfunction   [305609]       Mixed hyperlipidemia   [272.2.ICD-9-CM]       Vitamin D deficiency disease   [671921]       Gastroesophageal reflux disease with esophagitis   [9714985]       Chronic low back pain with sciatica, sciatica laterality unspecified, unspecified back pain laterality   [7655040]       Benign non-nodular prostatic hyperplasia with lower urinary tract symptoms   [2056124]       Achalasia of esophagus   [055352]       Chronic atrial fibrillation (CMS-[facility])   [296555]       Osteopenia of spine   [6216383]       Anticoagulated on warfarin   [687666]       Biliary tract imaging abnormality   [831485]       Vitamin D deficiency   [5537301]         Vital Signs     Blood Pressure Pulse Temperature Height Weight Body Mass Index    126/70 mmHg 56 36.6 °C (97.9 °F) 1.854 m (6' 0.99\") 85.73 kg (189 lb) 24.94 kg/m2    Oxygen Saturation Smoking Status                97% Former Smoker          Basic Information     Date Of Birth Sex Race Ethnicity Preferred Language    1940 Male White Non- English      Your appointments     Aug 28, 2017  8:15 AM   Anti-Coag Routine with [place] [facility]ARMAC[place]   [facility] [place]    [address] [place] [id number] [phone]            2017 " 11:20 AM   Urgent/Same Day with Avery Nelson M.D.   [facility] [address] ([facility])    [address] [address]   [phone]           You will be receiving a confirmation call a few days before your appointment from our automated call confirmation system.            Dec 01, 2017 10:00 AM   Established Patient with Avery Nelson M.D.   [facility] [address] ([facility])    [address] [place] [address]   [phone]           You will be receiving a confirmation call a few days before your appointment from our automated call confirmation system.              Problem List              ICD-10-CM Priority Class Noted - Resolved    Essential hypertension I10   4/5/2011 - Present    Panlobular emphysema (CMS-HCC) J43.1   3/9/2012 - Present    Left ventricular systolic dysfunction-EF=45-50% ECHO 2012- normal cardiac cath 2014- dr pugh at [facility] I51.9   6/19/2012 - Present    Mixed hyperlipidemia E78.2   6/19/2012 - Present    Vitamin D deficiency disease E55.9   12/2/2013 - Present    Achalasia of esophagus- fundoplication at [facility] 2014- [facility] K22.0   2/26/2014 - Present    Gastroesophageal reflux disease with esophagitis- fundoplication at [facility] 2014 K21.0   4/9/2015 - Present    Benign non-nodular prostatic hyperplasia with lower urinary tract symptoms- neg cysto 2014 N40.1   4/9/2015 - Present    Chronic atrial fibrillation (CMS-HCC)- DR PUGH I48.2   10/14/2015 - Present    Anticoagulated on warfarin- a fib, dr rhodes Z79.01   11/24/2015 - Present    Moderate episode of recurrent major depressive disorder (CMS-HCC) F33.1   12/1/2016 - Present    Biliary tract imaging abnormality- dr sotelo; sp biliary sphincterotomy jan 2017 R93.2 High  1/25/2017 - Present    Osteopenia of spine-DEXA scan 2014 M85.88   6/1/2017 - Present      Health Maintenance        Date Due Completion Dates    IMM DTaP/Tdap/Td Vaccine (1 - Tdap) 1/11/1960 ---    IMM ZOSTER VACCINE 1/11/2001 ---    IMM  PNEUMOCOCCAL 65+ (ADULT) LOW/MEDIUM RISK SERIES (2 of 2 - PPSV23) 1/14/2016 1/14/2015    IMM INFLUENZA (1) 9/1/2017 9/15/2016, 10/17/2014, 9/1/2012    COLONOSCOPY 8/31/2020 8/31/2015, 1/29/2013            Current Immunizations     13-VALENT PCV PREVNAR 1/14/2015    Influenza TIV (IM) 9/1/2012    Influenza Vaccine 9/1/2011, 10/1/2010    Influenza Vaccine Adult HD 9/15/2016    Influenza Vaccine Quad Inj (Preserved) 10/17/2014    Pneumococcal Vaccine (UF)Historical Data 10/1/2010      Below and/or attached are the medications your provider expects you to take. Review all of your home medications and newly ordered medications with your provider and/or pharmacist. Follow medication instructions as directed by your provider and/or pharmacist. Please keep your medication list with you and share with your provider. Update the information when medications are discontinued, doses are changed, or new medications (including over-the-counter products) are added; and carry medication information at all times in the event of emergency situations     Allergies:  OTHER ENVIRONMENTAL - (reactions not documented)               Medications  Valid as of: July 27, 2017 -  4:48 PM    Generic Name Brand Name Tablet Size Instructions for use    Aspirin (Chew Tab) ASA 81 MG Take 1 Tab by mouth every day.        Cholecalciferol (Cap) Vitamin D 2000 UNITS Take  by mouth.        Citalopram Hydrobromide (Tab) CELEXA 20 MG Take 1 Tab by mouth every day.        Cyanocobalamin (Tab) VITAMIN B-12 500 MCG Take 500 mcg by mouth 2 Times a Day.        Finasteride (Tab) PROSCAR 5 MG Take 1 Tab by mouth every day.        Lisinopril (Tab) PRINIVIL 20 MG Take 1 tablet by mouth  every day        Metoprolol Succinate (TABLET SR 24 HR) TOPROL XL 50 MG Take 1 Tab by mouth every day. Dr khan to refill        Multiple Vitamins-Minerals (Tab) THERAGRAN-M  Take 1 Tab by mouth every day.        Omega-3 Fatty Acids (Cap) OMEGA 3 FA 1000 MG Take 1 Cap by mouth 3  times a day, with meals.        Omeprazole (CAPSULE DELAYED RELEASE) PRILOSEC 20 MG Take 1 capsule by mouth  twice daily        Pravastatin Sodium (Tab) PRAVACHOL 40 MG Take 1 tablet by mouth  daily        Warfarin Sodium (Tab) COUMADIN 5 MG Take 1 and 1/2 tablets by mouth once daily or as directed by coumadin clinic        .                 Medicines prescribed today were sent to:     [facility] - [address]    [address] [facility] [address]    Phone: [phone] Fax: [phone]    Open 24 Hours?: No    [id number] - [place], [place] - [address]    [address] [place] [address]    Phone: [phone] Fax: [phone]    Open 24 Hours?: No      Medication refill instructions:       If your prescription bottle indicates you have medication refills left, it is not necessary to call your provider’s office. Please contact your pharmacy and they will refill your medication.    If your prescription bottle indicates you do not have any refills left, you may request refills at any time through one of the following ways: The online [employer] system (except Urgent Care), by calling your provider’s office, or by asking your pharmacy to contact your provider’s office with a refill request. Medication refills are processed only during regular business hours and may not be available until the next business day. Your provider may request additional information or to have a follow-up visit with you prior to refilling your medication.   *Please Note: Medication refills are assigned a new Rx number when refilled electronically. Your pharmacy may indicate that no refills were authorized even though a new prescription for the same medication is available at the pharmacy. Please request the medicine by name with the pharmacy before contacting your provider for a refill.        Your To Do List     Future Labs/Procedures Complete By Expires    CBC WITH  DIFFERENTIAL  As directed 7/27/2018    COMP METABOLIC PANEL  As directed 7/27/2018    FREE THYROXINE  As directed 7/27/2018    LIPID PROFILE  As directed 7/27/2018    TSH  As directed 7/27/2018    VITAMIN D,25 HYDROXY  As directed 7/27/2018    WESTERGREN SED RATE  As directed 7/27/2018         MyChart Status: Patient Declined

## 2017-07-27 NOTE — PROGRESS NOTES
"Subjective:      Isaiah Coronel is a 77 y.o. male who presents with Depression  The patient is here for followup of chronic medical problems listed below. The patient is compliant with medications and having no side effects from them. Denies chest pain, abdominal pain, dyspnea, myalgias, or cough.   Patient Active Problem List    Diagnosis Date Noted   • Biliary tract imaging abnormality- dr sotelo; sp biliary sphincterotomy jan 2017 01/25/2017     Priority: High   • Osteopenia of spine-DEXA scan 2014 06/01/2017   • Moderate episode of recurrent major depressive disorder (CMS-HCC) 12/01/2016   • Anticoagulated on warfarin- a fib, dr rhodes 11/24/2015   • [place] PUGH 10/14/2015   • Gastroesophageal reflux disease with esophagitis- fundoplication at [facility] 2014 04/09/2015   • Benign non-nodular prostatic hyperplasia with lower urinary tract symptoms- neg cysto 2014 04/09/2015   • Achalasia of esophagus- fundoplication at [facility] 2014- [facility] 02/26/2014   • Vitamin D deficiency disease 12/02/2013   • Left ventricular systolic dysfunction-EF=45-50% ECHO 2012- normal cardiac cath 2014- dr pugh at [facility] 06/19/2012   • Mixed hyperlipidemia 06/19/2012   • Panlobular emphysema (CMS-HCC) 03/09/2012   • Essential hypertension 04/05/2011     Allergies   Allergen Reactions   • Other Environmental      \"My nose and eyes are bothered when I walk outside.\"     Outpatient Prescriptions Prior to Visit   Medication Sig Dispense Refill   • omeprazole (PRILOSEC) 20 MG delayed-release capsule Take 1 capsule by mouth  twice daily 90 Cap 4   • finasteride (PROSCAR) 5 MG Tab Take 1 Tab by mouth every day. 90 Tab 4   • lisinopril (PRINIVIL) 20 MG Tab Take 1 tablet by mouth  every day 90 Tab 4   • warfarin (COUMADIN) 5 MG Tab Take 1 and 1/2 tablets by mouth once daily or as directed by coumadin clinic 135 Tab 1   • therapeutic multivitamin-minerals (THERAGRAN-M) Tab Take 1 Tab by mouth every day.     • pravastatin " "(PRAVACHOL) 40 MG tablet Take 1 tablet by mouth  daily 90 Tab 4   • Cholecalciferol (VITAMIN D) 2000 UNITS Cap Take  by mouth. 30 Cap    • metoprolol SR (TOPROL XL) 50 MG TABLET SR 24 HR Take 1 Tab by mouth every day. Dr khan to refill 90 Tab 1   • aspirin (ASA) 81 MG Chew Tab chewable tablet Take 1 Tab by mouth every day. 100 Tab    • docosahexanoic acid (OMEGA 3 FA) 1000 MG CAPS Take 1 Cap by mouth 3 times a day, with meals. 90 Cap 11   • cyanocobalamin (VITAMIN B-12) 500 MCG TABS Take 500 mcg by mouth 2 Times a Day.     • citalopram (CELEXA) 10 MG tablet Take 1 tablet by mouth  every day 90 Tab 0     No facility-administered medications prior to visit.               Depression        Review of Systems   Constitutional: Negative.    HENT: Negative.    Eyes: Negative.    Respiratory: Negative.    Cardiovascular: Negative.    Gastrointestinal: Negative.    Genitourinary: Negative.    Musculoskeletal: Negative.    Skin: Negative.    Neurological: Negative.    Endo/Heme/Allergies: Negative.    Psychiatric/Behavioral: Negative.           Objective:     /70 mmHg  Pulse 56  Temp(Src) 36.6 °C (97.9 °F)  Ht 1.854 m (6' 0.99\")  Wt 85.73 kg (189 lb)  BMI 24.94 kg/m2  SpO2 97%     Physical Exam   Constitutional: He is oriented to person, place, and time. He appears well-developed and well-nourished. No distress.   HENT:   Head: Normocephalic and atraumatic.   Right Ear: External ear normal.   Left Ear: External ear normal.   Mouth/Throat: Oropharynx is clear and moist. No oropharyngeal exudate.   Eyes: Conjunctivae and EOM are normal. Pupils are equal, round, and reactive to light. Right eye exhibits no discharge.   Neck: Normal range of motion. Neck supple. No JVD present. No tracheal deviation present. No thyromegaly present.   Cardiovascular: Normal rate, regular rhythm, normal heart sounds and intact distal pulses.  Exam reveals no gallop.    Pulmonary/Chest: Effort normal and breath sounds normal. No " respiratory distress. He has no wheezes. He has no rales. He exhibits no tenderness.   Abdominal: Soft. Bowel sounds are normal. He exhibits no distension and no mass. There is no tenderness. There is no rebound and no guarding. No hernia.   Genitourinary: Guaiac negative stool. No penile tenderness.   Musculoskeletal: He exhibits no edema or tenderness.   Lymphadenopathy:     He has no cervical adenopathy.   Neurological: He is alert and oriented to person, place, and time. He has normal reflexes. He displays normal reflexes. No cranial nerve deficit. He exhibits normal muscle tone. Coordination normal.   Skin: Skin is warm and dry. No rash noted. He is not diaphoretic. No erythema. No pallor.   Psychiatric: He has a normal mood and affect. His behavior is normal. Judgment and thought content normal.   Nursing note and vitals reviewed.    Anticoagulation Visit on 07/21/2017   Component Date Value   • INR 07/21/2017 2.1    Anticoagulation Visit on 06/30/2017   Component Date Value   • INR 06/30/2017 2.5       No results found for: HBA1C  Lab Results   Component Value Date/Time    SODIUM 140 05/25/2017 07:04 AM    POTASSIUM 4.2 05/25/2017 07:04 AM    CHLORIDE 104 05/25/2017 07:04 AM    CO2 26 05/25/2017 07:04 AM    GLUCOSE 68 05/25/2017 07:04 AM    BUN 22 05/25/2017 07:04 AM    CREATININE 0.83 05/25/2017 07:04 AM    ALKALINE PHOSPHATASE 63 05/25/2017 07:04 AM    AST(SGOT) 17 05/25/2017 07:04 AM    ALT(SGPT) 11 05/25/2017 07:04 AM    TOTAL BILIRUBIN 1.4 05/25/2017 07:04 AM     Lab Results   Component Value Date/Time    INR 2.1 07/21/2017 08:21 AM    INR 2.5 06/30/2017 08:18 AM    INR 3.3 06/16/2017 07:55 AM     Lab Results   Component Value Date/Time    CHOLESTEROL, 05/25/2017 07:04 AM    LDL 87 05/25/2017 07:04 AM    HDL 51 05/25/2017 07:04 AM    TRIGLYCERIDES 52 05/25/2017 07:04 AM       Lab Results   Component Value Date/Time    TESTOSTERONE,TOTAL 445 06/13/2012 08:46 AM     No results found for: TSH  Lab  Results   Component Value Date/Time    FREE T-4 1.14 03/31/2014 10:33 AM    FREE T-4 0.91 03/30/2012 09:19 AM     Lab Results   Component Value Date/Time    URIC ACID 5.9 03/30/2012 09:19 AM     No components found for: VITB12  Lab Results   Component Value Date/Time    25-HYDROXY   VITAMIN D 25 44 05/19/2016 09:52 AM    25-HYDROXY   VITAMIN D 25 67 10/02/2014 07:15 AM                    Assessment/Plan:     1. Moderate episode of recurrent major depressive disorder (CMS-HCC)-. Not at goal. Increase Celexa to 20 mg daily. Referred to counseling.     - citalopram (CELEXA) 20 MG Tab; Take 1 Tab by mouth every day.  Dispense: 90 Tab; Refill: 4    2. Essential hypertensionUnder good control. Continue same regimen.       3. Panlobular emphysema (CMS-[facility])-patient getting more short of breath with exertion. We'll check pulmonary function studies and consider referral to pulmonary. Hold off on medication for now. No wheezes      - PULMONARY FUNCTION TESTS Test requested: Complete Pulmonary Function Test    4. Left ventricular systolic dysfunction-EF=45-50% ECHO 2012- normal cardiac cath 2014- dr khan at [facility] good control. Continue same regimen.           5. Mixed hyperlipidemiaUnder good control. Continue same regimen.      - TSH; Future  - FREE THYROXINE; Future  - COMP METABOLIC PANEL; Future  - LIPID PROFILE; Future  - CBC WITH DIFFERENTIAL; Future  - WESTERGREN SED RATE; Future    6. Vitamin D deficiency diseaseUnder good control. Continue same regimen.       7. Gastroesophageal reflux disease with esophagitis- fundoplication at [facility] [place] 2014Under good control. Continue same regimen.       8. Chronic low back pain with sciatica, sciatica laterality unspecified, unspecified back pain lateralityUnder good control. Continue same regimen.     9. Benign non-nodular prostatic hyperplasia with lower urinary tract symptoms- neg cysto 2014Under good control. Continue same regimen.       10. Achalasia of esophagus-  fundoplication at [facility] 2014- [place] good control. Continue same regimen.       11. Chronic atrial fibrillation (CMS-HCC)Under good control. Continue same regimen.       12. Osteopenia of spine-DEXA scan 2014Under good control. Continue same regimen.       13. Anticoagulated on warfarin- a fib, dr Holbrook good control. Continue same regimen.       14. Biliary tract imaging abnormality- dr sotelo; sp biliary sphincterotomy jan 2017Under good control. Continue same regimen.        15. Vitamin D deficiencyUnder good control. Continue same regimen.     - VITAMIN D,25 HYDROXY; Future      40 minute face-to-face encounter took place today.  More than half of this time was spent in the coordination of care of the above problems, as well as counseling.

## 2017-08-08 ENCOUNTER — HOSPITAL ENCOUNTER (OUTPATIENT)
Dept: OTHER | Facility: MEDICAL CENTER | Age: 77
End: 2017-08-08
Attending: INTERNAL MEDICINE
Payer: MEDICARE

## 2017-08-08 DIAGNOSIS — J43.1 PANLOBULAR EMPHYSEMA (HCC): ICD-10-CM

## 2017-08-08 PROCEDURE — 94726 PLETHYSMOGRAPHY LUNG VOLUMES: CPT

## 2017-08-08 PROCEDURE — 94726 PLETHYSMOGRAPHY LUNG VOLUMES: CPT | Mod: 26 | Performed by: INTERNAL MEDICINE

## 2017-08-08 PROCEDURE — 94060 EVALUATION OF WHEEZING: CPT

## 2017-08-08 PROCEDURE — 94729 DIFFUSING CAPACITY: CPT | Mod: 26 | Performed by: INTERNAL MEDICINE

## 2017-08-08 PROCEDURE — 94729 DIFFUSING CAPACITY: CPT

## 2017-08-08 PROCEDURE — 94060 EVALUATION OF WHEEZING: CPT | Mod: 26 | Performed by: INTERNAL MEDICINE

## 2017-08-08 ASSESSMENT — PULMONARY FUNCTION TESTS
FEV1_PERCENT_CHANGE: 3
FEV1/FVC_PERCENT_CHANGE: 75
FVC_PREDICTED: 4.43
FEV1_PERCENT_PREDICTED: 69
FEV1_PERCENT_PREDICTED: 71
FEV1/FVC_PERCENT_PREDICTED: 80
FEV1/FVC_PERCENT_PREDICTED: 74
FEV1_PERCENT_CHANGE: 4
FVC_PERCENT_PREDICTED: 86
FEV1/FVC_PERCENT_PREDICTED: 79
FEV1: 2.27
FEV1_PREDICTED: 3.28
FVC: 3.83
FEV1/FVC: 58.6
FEV1: 2.35
FEV1/FVC: 59
FVC_PERCENT_PREDICTED: 90
FVC: 4.01

## 2017-08-11 NOTE — PROCEDURES
This is a 77-year-old male.    Spirometry demonstrates FEV1/FVC ratio of 59%.  The FEV1 is 2.27 liters or 69%   of predicted.  The FVC is 3.83 liters or 86% predicted.  There is no   bronchodilator response and the flow volume loop demonstrates an obstructive   pattern.    Plethysmography demonstrates a total lung capacity of 96%.  The residual   volume is 140% predicted.  The corrected DLCO is 80%.    IMPRESSION:  The study demonstrates an irreversible obstructive defect which   is moderate.  There is evidence of gas trapping and DLCO is low normal.       ____________________________________     MD CARLOS Osman / SARAH    DD:  08/11/2017 12:14:45  DT:  08/11/2017 13:49:49    D#:  [id number]  Job#:  [id number]

## 2017-08-28 ENCOUNTER — ANTICOAGULATION VISIT (OUTPATIENT)
Dept: MEDICAL GROUP | Facility: PHYSICIAN GROUP | Age: 77
End: 2017-08-28
Payer: MEDICARE

## 2017-08-28 DIAGNOSIS — I48.20 CHRONIC ATRIAL FIBRILLATION (HCC): ICD-10-CM

## 2017-08-28 LAB — INR PPP: 3.3 (ref 2–3.5)

## 2017-08-28 PROCEDURE — 99999 PR NO CHARGE: CPT | Performed by: NURSE PRACTITIONER

## 2017-08-28 PROCEDURE — 85610 PROTHROMBIN TIME: CPT | Performed by: NURSE PRACTITIONER

## 2017-08-28 NOTE — PROGRESS NOTES
Anticoagulation Summary  As of 8/28/2017    INR goal:   2.0-3.0   TTR:   53.5 % (2.2 y)   Today's INR:   3.3!   Maintenance plan:   5 mg (5 mg x 1) on Mon, Wed, Fri; 7.5 mg (5 mg x 1.5) all other days   Weekly total:   45 mg   Plan last modified:   Kelvin Guy PharmD (6/16/2017)   Next INR check:   9/11/2017   Target end date:   Indefinite    Indications    A-fib- chronic warfarin rx (Resolved) [I48.91]  Chronic atrial fibrillation (CMS-[facility])- DR PUGH [I48.2]             Anticoagulation Episode Summary     INR check location[facility]   [facility]    Preferred lab:       Send INR reminders to:       Comments:         Anticoagulation Care Providers     Provider Role Specialty Phone number    Duncan Tierney M.D. Referring Family Medicine [phone]    Alonso Pugh M.D.  Cardiology [phone]    Karlo BealD Responsible          Anticoagulation Patient Findings  Patient Findings     Negatives:   Signs/symptoms of thrombosis, Signs/symptoms of bleeding, Laboratory test error suspected, Change in health, Change in alcohol use, Change in activity, Upcoming invasive procedure, Emergency department visit, Upcoming dental procedure, Missed doses, Extra doses, Change in medications, Change in diet/appetite, Hospital admission, Bruising, Other complaints        Patient is supratherapeutic today with INR of 3.3.  Pt denies any unusual s/s of bleeding, bruising, clotting or any changes to diet or medications.  Instructed patient to HOLD X 1, then continue with current warfarin dosing regimen.  Patient declines BP/vitals check today.  Follow up in 2 weeks.    Kelvin Guy PharmD

## 2017-09-11 ENCOUNTER — ANTICOAGULATION VISIT (OUTPATIENT)
Dept: MEDICAL GROUP | Facility: PHYSICIAN GROUP | Age: 77
End: 2017-09-11
Payer: MEDICARE

## 2017-09-11 DIAGNOSIS — I48.20 CHRONIC ATRIAL FIBRILLATION (HCC): ICD-10-CM

## 2017-09-11 LAB — INR PPP: 2.3 (ref 2–3.5)

## 2017-09-11 PROCEDURE — 85610 PROTHROMBIN TIME: CPT | Performed by: FAMILY MEDICINE

## 2017-09-11 PROCEDURE — 99211 OFF/OP EST MAY X REQ PHY/QHP: CPT | Performed by: FAMILY MEDICINE

## 2017-09-11 NOTE — PROGRESS NOTES
Anticoagulation Summary  As of 9/11/2017    INR goal:   2.0-3.0   TTR:   53.8 % (2.2 y)   Today's INR:   2.3   Maintenance plan:   5 mg (5 mg x 1) on Mon, Wed, Fri; 7.5 mg (5 mg x 1.5) all other days   Weekly total:   45 mg   Plan last modified:   Kelvin Guy PharmD (6/16/2017)   Next INR check:   10/9/2017   Target end date:   Indefinite    Indications    A-fib- chronic warfarin rx (Resolved) [I48.91]  Chronic atrial fibrillation (CMS-HCC)- DR PUGH [I48.2]             Anticoagulation Episode Summary     INR check location[facility]   [facility]    Preferred lab:       Send INR reminders to:       Comments:         Anticoagulation Care Providers     Provider Role Specialty Phone number    Duncan Tierney M.D. Referring Family Medicine [phone]    Alonso Pugh M.D.  Cardiology [phone]    Karlo BealD Responsible          Anticoagulation Patient Findings  Patient Findings     Positives:   Missed doses    Negatives:   Signs/symptoms of thrombosis, Signs/symptoms of bleeding, Laboratory test error suspected, Change in health, Change in alcohol use, Change in activity, Upcoming invasive procedure, Emergency department visit, Upcoming dental procedure, Extra doses, Change in medications, Change in diet/appetite, Hospital admission, Bruising, Other complaints        HPI:   Interval history since last visit: Pt denies any unusual s/s of bleeding, bruising, clotting or any changes to diet or medications.  Compliant with current weekly warfarin regimen as detailed above.        Vitals:  /76  HR 60  Patient came from the gym to appointment, declines recheck      Asssessment:  INR therapeutic at 2.3      Plan:  Pt is to continue with current warfarin dosing regimen.     Follow up in 4 weeks.    Kelvin Guy, KarloD

## 2017-09-13 ENCOUNTER — OFFICE VISIT (OUTPATIENT)
Dept: URGENT CARE | Facility: PHYSICIAN GROUP | Age: 77
End: 2017-09-13
Payer: MEDICARE

## 2017-09-13 VITALS
SYSTOLIC BLOOD PRESSURE: 122 MMHG | OXYGEN SATURATION: 95 % | DIASTOLIC BLOOD PRESSURE: 60 MMHG | HEIGHT: 72 IN | TEMPERATURE: 98 F | HEART RATE: 46 BPM | WEIGHT: 189 LBS | RESPIRATION RATE: 12 BRPM | BODY MASS INDEX: 25.6 KG/M2

## 2017-09-13 DIAGNOSIS — S89.92XA LEG INJURY, LEFT, INITIAL ENCOUNTER: ICD-10-CM

## 2017-09-13 DIAGNOSIS — L03.116 CELLULITIS OF LEFT LOWER EXTREMITY: ICD-10-CM

## 2017-09-13 PROCEDURE — 99214 OFFICE O/P EST MOD 30 MIN: CPT | Performed by: PHYSICIAN ASSISTANT

## 2017-09-13 RX ORDER — CEPHALEXIN 500 MG/1
500 CAPSULE ORAL 4 TIMES DAILY
Qty: 20 CAP | Refills: 0 | Status: SHIPPED | OUTPATIENT
Start: 2017-09-13 | End: 2017-09-18

## 2017-09-13 ASSESSMENT — ENCOUNTER SYMPTOMS
NUMBNESS: 0
SHORTNESS OF BREATH: 0
VOMITING: 0
INABILITY TO BEAR WEIGHT: 0
CHILLS: 0
LOSS OF MOTION: 0
ABDOMINAL PAIN: 0
DIZZINESS: 0
HEADACHES: 0
NAUSEA: 0
FEVER: 0
LOSS OF SENSATION: 0
DIARRHEA: 0
TINGLING: 0

## 2017-09-13 ASSESSMENT — PAIN SCALES - GENERAL: PAINLEVEL: 3=SLIGHT PAIN

## 2017-09-13 NOTE — PROGRESS NOTES
Subjective:      Isaiah Coronel is a 77 y.o. male who presents with Leg Injury (x1wk L leg bruising, swelling pt was playing softball)            Leg Injury    The incident occurred less than 1 hour ago. [place]. The injury mechanism was a direct blow. The pain is present in the left leg. The quality of the pain is described as aching. The pain is at a severity of 2/10. The pain is mild. The pain has been constant since onset. Pertinent negatives include no inability to bear weight, loss of motion, loss of sensation, numbness or tingling. He reports no foreign bodies present. The symptoms are aggravated by palpation and weight bearing. He has tried ice and elevation for the symptoms. The treatment provided mild relief.     Patient presents to urgent care reporting left lower extremity pain and swelling following an injury that occurred while playing softball 1 week ago. He was playing softball when the ball hit him directly in the shin. Since the incident, he has been experiencing left lower extremity pain in the mornings when he first bears weight, but he reports this pain resolves completely after walking about 5 steps. He woke up yesterday with some swelling and bruising localized in his left foot. He denies any foot pain or distal numbness/tingling. He is concerned for a blood clot. He denies any history of DVT/PE and is currently on coumadin for chronic A fib. Last INR was measured 2 days ago and was 2.3.     Review of Systems   Constitutional: Negative for chills and fever.   Respiratory: Negative for shortness of breath.    Cardiovascular: Negative for chest pain.   Gastrointestinal: Negative for abdominal pain, diarrhea, nausea and vomiting.   Genitourinary: Negative.    Musculoskeletal:        Positive for leg pain   Neurological: Negative for dizziness, tingling, numbness and headaches.          Objective:     /60   Pulse (!) 46   Temp 36.7 °C (98 °F)   Resp 12   Ht  1.829 m (6')   Wt 85.7 kg (189 lb)   SpO2 95%   BMI 25.63 kg/m²      Physical Exam   Constitutional: He is oriented to person, place, and time. He appears well-developed and well-nourished. No distress.   HENT:   Head: Normocephalic and atraumatic.   Eyes: Pupils are equal, round, and reactive to light.   Neck: Normal range of motion.   Cardiovascular: Normal rate.    Pulmonary/Chest: Effort normal.   Musculoskeletal: Normal range of motion.        Left foot: There is no tenderness and no bony tenderness.   Slight, diffuse swelling and ecchymosis present over left foot without any tenderness    Neurological: He is alert and oriented to person, place, and time.   Skin: Skin is warm and dry. He is not diaphoretic. There is erythema.        Small puncture wound present on anterior aspect of left lower extremity, with surrounding area of erythema that is hot to touch.    Psychiatric: He has a normal mood and affect. His behavior is normal.   Nursing note and vitals reviewed.         PMH:  has a past medical history of Angina; Anticoagulant long-term use; Arthritis; Atrial fibrillation, controlled ([facility]); Backpain; Bowel habit changes; Breath shortness; Bronchitis (1/2017); Cancer (CMS-HCC); Cataract; Cold (1/2017); Dental disorder; EMPHYSEMA; GERD (gastroesophageal reflux disease); Glaucoma; Hernia of unspecified site of abdominal cavity without mention of obstruction or gangrene; Hiatus hernia syndrome; High cholesterol; Hypertension; Indigestion; Pneumonia; Psychiatric problem; and Sleep apnea.  MEDS:   Current Outpatient Prescriptions:   •  cephALEXin (KEFLEX) 500 MG Cap, Take 1 Cap by mouth 4 times a day for 5 days., Disp: 20 Cap, Rfl: 0  •  citalopram (CELEXA) 20 MG Tab, Take 1 Tab by mouth every day., Disp: 90 Tab, Rfl: 4  •  omeprazole (PRILOSEC) 20 MG delayed-release capsule, Take 1 capsule by mouth  twice daily, Disp: 90 Cap, Rfl: 4  •  lisinopril (PRINIVIL) 20 MG Tab, Take 1 tablet by mouth  every day,  "Disp: 90 Tab, Rfl: 4  •  warfarin (COUMADIN) 5 MG Tab, Take 1 and 1/2 tablets by mouth once daily or as directed by coumadin clinic, Disp: 135 Tab, Rfl: 1  •  therapeutic multivitamin-minerals (THERAGRAN-M) Tab, Take 1 Tab by mouth every day., Disp: , Rfl:   •  pravastatin (PRAVACHOL) 40 MG tablet, Take 1 tablet by mouth  daily, Disp: 90 Tab, Rfl: 4  •  Cholecalciferol (VITAMIN D) 2000 UNITS Cap, Take  by mouth., Disp: 30 Cap, Rfl:   •  metoprolol SR (TOPROL XL) 50 MG TABLET SR 24 HR, Take 1 Tab by mouth every day. Dr khan to refill, Disp: 90 Tab, Rfl: 1  •  aspirin (ASA) 81 MG Chew Tab chewable tablet, Take 1 Tab by mouth every day., Disp: 100 Tab, Rfl:   •  docosahexanoic acid (OMEGA 3 FA) 1000 MG CAPS, Take 1 Cap by mouth 3 times a day, with meals., Disp: 90 Cap, Rfl: 11  •  cyanocobalamin (VITAMIN B-12) 500 MCG TABS, Take 500 mcg by mouth 2 Times a Day., Disp: , Rfl:   •  finasteride (PROSCAR) 5 MG Tab, Take 1 Tab by mouth every day. (Patient not taking: Reported on 9/13/2017), Disp: 90 Tab, Rfl: 4  ALLERGIES:   Allergies   Allergen Reactions   • Other Environmental      \"My nose and eyes are bothered when I walk outside.\"     SURGHX:   Past Surgical History:   Procedure Laterality Date   • GASTROSCOPY N/A 1/25/2017    Procedure: GASTROSCOPY WITH DUODENAL BIOPSY;  Surgeon: Bradley Carreon M.D.;  Location: [facility];  Service:    • EGD W/ENDOSCOPIC ULTRASOUND N/A 1/25/2017    Procedure: EGD W/ENDOSCOPIC ULTRASOUND - CURVILINER UPPER PANCREATOBILIARY;  Surgeon: Bradley Carreon M.D.;  Location: [facility];  Service:    • ERCP W/REMOVAL CALCULUS N/A 1/25/2017    Procedure: ERCP W/REMOVAL CALCULUS;  Surgeon: Bradley Carreon M.D.;  Location: [facility];  Service:    • FUNDOPLICATON  2014    open nissen   • INGUINAL HERNIA REPAIR BILATERAL  12/19/2012    Performed by Monico Cox M.D. at SURGERY [facility]   • INGUINAL HERNIA REPAIR  2005    bilateral   • TRANS " URETHRAL RESECTION PROSTATE  2000    BPH   • ADRENAL TISSUE BIOPSY/ ADRENALECTOMY Right 19902     with repair of hepatic attachment; neg for ca   • CHOLECYSTECTOMY  1990s   • HIATAL HERNIA REPAIR  1990, 2014   • LESION EXCISION GENERAL      removal of skin CA     SOCHX:  reports that he quit smoking about 46 years ago. His smoking use included Cigarettes. He has a 20.00 pack-year smoking history. He has never used smokeless tobacco. He reports that he does not drink alcohol or use drugs.  FH: family history is not on file.       Assessment/Plan:     1. Leg injury, left, initial encounter    Patient is currently taking coumadin for chronic A fib. Last INR measured 2 days ago was 2.3. No history of DVT/PE, Homans negative on exam. Advised patient I am not concerned for DVT at this time. Encouraged to continue icing and elevating lower leg.     2. Cellulitis of left lower extremity  - cephALEXin (KEFLEX) 500 MG Cap; Take 1 Cap by mouth 4 times a day for 5 days.  Dispense: 20 Cap; Refill: 0   - Complete full course of antibiotics as prescribed     Area of erythema and warmth outlined at today's visit with black permanent sharpie. Encouraged patient to monitor the area closely for any spreading of the area outside the borders established at today's visit. The patient demonstrated a good understanding and agreed with the treatment plan.

## 2017-10-09 ENCOUNTER — ANTICOAGULATION VISIT (OUTPATIENT)
Dept: MEDICAL GROUP | Facility: PHYSICIAN GROUP | Age: 77
End: 2017-10-09
Payer: MEDICARE

## 2017-10-09 VITALS — DIASTOLIC BLOOD PRESSURE: 99 MMHG | HEART RATE: 56 BPM | SYSTOLIC BLOOD PRESSURE: 152 MMHG

## 2017-10-09 DIAGNOSIS — I48.20 CHRONIC ATRIAL FIBRILLATION (HCC): ICD-10-CM

## 2017-10-09 LAB — INR PPP: 2.5 (ref 2–3.5)

## 2017-10-09 PROCEDURE — 99999 PR NO CHARGE: CPT | Performed by: FAMILY MEDICINE

## 2017-10-09 PROCEDURE — 85610 PROTHROMBIN TIME: CPT | Performed by: FAMILY MEDICINE

## 2017-10-09 NOTE — PROGRESS NOTES
Anticoagulation Summary  As of 10/9/2017    INR goal:   2.0-3.0   TTR:   55.3 % (2.3 y)   Today's INR:   2.5   Maintenance plan:   5 mg (5 mg x 1) on Mon, Wed, Fri; 7.5 mg (5 mg x 1.5) all other days   Weekly total:   45 mg   Plan last modified:   Kelvin Guy PharmD (6/16/2017)   Next INR check:   11/13/2017   Target end date:   Indefinite    Indications    A-fib- chronic warfarin rx (Resolved) [I48.91]  Chronic atrial fibrillation (CMS-HCC)- DR PUGH [I48.2]             Anticoagulation Episode Summary     INR check location[facility]   [facility]    Preferred lab:       Send INR reminders to:       Comments:         Anticoagulation Care Providers     Provider Role Specialty Phone number    Duncan Tierney M.D. Referring Family Medicine [phone]    Alonso Pugh M.D.  Cardiology [phone]    Mikhail Camacho, PharmD Responsible          Anticoagulation Patient Findings  Patient Findings     Negatives:   Signs/symptoms of thrombosis, Signs/symptoms of bleeding, Laboratory test error suspected, Change in health, Change in alcohol use, Change in activity, Upcoming invasive procedure, Emergency department visit, Upcoming dental procedure, Missed doses, Extra doses, Change in medications, Change in diet/appetite, Hospital admission, Bruising, Other complaints        HPI:   Isaiah Coronel seen in clinic today, on anticoagulation therapy with warfarin for atrial fibrillation  Changes to current medical/health status since last appt: NONE  Denies signs/symptoms of bleeding and/or thrombosis since the last appt.    Denies any interval changes to diet  Denies any interval changes to medications since last appt.   Denies any complications or cost restrictions with current therapy.      Vitals:  /99  HR 56  Patient declines recheck     Asssessment:  INR therapeutic at 2.5      Plan:  Pt is to continue with current warfarin dosing regimen.     Follow up in 5 weeks.    Kelvin Guy, Claus

## 2017-10-16 RX ORDER — OMEPRAZOLE 20 MG/1
CAPSULE, DELAYED RELEASE ORAL
Qty: 180 CAP | Refills: 4 | Status: SHIPPED | OUTPATIENT
Start: 2017-10-16

## 2017-10-25 DIAGNOSIS — E78.5 HYPERLIPIDEMIA, UNSPECIFIED HYPERLIPIDEMIA TYPE: ICD-10-CM

## 2017-10-25 RX ORDER — PRAVASTATIN SODIUM 40 MG
TABLET ORAL
Qty: 90 TAB | Refills: 4 | Status: SHIPPED | OUTPATIENT
Start: 2017-10-25 | End: 2017-12-01 | Stop reason: SDUPTHER

## 2017-10-25 NOTE — TELEPHONE ENCOUNTER
Was the patient seen in the last year in this department? Yes     Does patient have an active prescription for medications requested? Yes pt needs rx to go to mail order pharmacy.     Received Request Via: Patient

## 2017-11-01 ENCOUNTER — TELEPHONE (OUTPATIENT)
Dept: MEDICAL GROUP | Age: 77
End: 2017-11-01

## 2017-11-01 NOTE — TELEPHONE ENCOUNTER
ESTABLISHED PATIENT PRE-VISIT PLANNING     Note: Patient will not be contacted if there is no indication to call.     1.  Reviewed notes from the last few office visits within the medical group: Yes    2.  If any orders were placed at last visit or intended to be done for this visit (i.e. 6 mos follow-up), do we have Results/Consult Notes?        •  Labs - Labs ordered, but not to be completed until 12/1.   Note: If patient appointment is for lab review and patient did not complete labs,                check with provider if OK to reschedule patient until labs completed.       •  Imaging - Imaging was not ordered at last office visit.       •  Referrals - No referrals were ordered at last office visit.    3. Is this appointment scheduled as a Hospital Follow-Up? No    4.  Immunizations were updated in Epic using WebIZ?: Epic matches WebIZ       •  Web Iz Recommendations: TDAP and ZOSTAVAX (Shingles)    5.  Patient is due for the following Health Maintenance Topics:   Health Maintenance Due   Topic Date Due   • Annual Wellness Visit  1940   • IMM DTaP/Tdap/Td Vaccine (1 - Tdap) 01/11/1960   • IMM ZOSTER VACCINE  01/11/2001       - Patient is up-to-date on all Health Maintenance topics. No records have been requested at this time.    6.  Patient was NOT informed to arrive 15 min prior to their scheduled appointment and bring in their medication bottles.

## 2017-11-02 ENCOUNTER — OFFICE VISIT (OUTPATIENT)
Dept: MEDICAL GROUP | Age: 77
End: 2017-11-02
Payer: MEDICARE

## 2017-11-02 VITALS
SYSTOLIC BLOOD PRESSURE: 138 MMHG | HEIGHT: 72 IN | OXYGEN SATURATION: 95 % | RESPIRATION RATE: 16 BRPM | BODY MASS INDEX: 26.01 KG/M2 | HEART RATE: 76 BPM | TEMPERATURE: 98.1 F | WEIGHT: 192 LBS | DIASTOLIC BLOOD PRESSURE: 68 MMHG

## 2017-11-02 DIAGNOSIS — K21.00 GASTROESOPHAGEAL REFLUX DISEASE WITH ESOPHAGITIS: ICD-10-CM

## 2017-11-02 DIAGNOSIS — I48.20 CHRONIC ATRIAL FIBRILLATION (HCC): ICD-10-CM

## 2017-11-02 DIAGNOSIS — J43.1 PANLOBULAR EMPHYSEMA (HCC): ICD-10-CM

## 2017-11-02 DIAGNOSIS — Z79.01 ANTICOAGULATED ON WARFARIN: ICD-10-CM

## 2017-11-02 DIAGNOSIS — R94.2 ABNORMAL PFTS (PULMONARY FUNCTION TESTS): ICD-10-CM

## 2017-11-02 DIAGNOSIS — F33.1 MODERATE EPISODE OF RECURRENT MAJOR DEPRESSIVE DISORDER (HCC): ICD-10-CM

## 2017-11-02 DIAGNOSIS — E78.2 MIXED HYPERLIPIDEMIA: ICD-10-CM

## 2017-11-02 DIAGNOSIS — I10 ESSENTIAL HYPERTENSION: ICD-10-CM

## 2017-11-02 DIAGNOSIS — N40.1 BENIGN NON-NODULAR PROSTATIC HYPERPLASIA WITH LOWER URINARY TRACT SYMPTOMS: ICD-10-CM

## 2017-11-02 DIAGNOSIS — R45.4 OUTBURSTS OF ANGER: ICD-10-CM

## 2017-11-02 PROBLEM — I50.22 CHRONIC SYSTOLIC HEART FAILURE (HCC): Status: ACTIVE | Noted: 2017-11-02

## 2017-11-02 PROBLEM — J45.40 MODERATE PERSISTENT ASTHMA WITHOUT COMPLICATION: Status: RESOLVED | Noted: 2017-11-02 | Resolved: 2017-11-02

## 2017-11-02 PROBLEM — J45.40 MODERATE PERSISTENT ASTHMA WITHOUT COMPLICATION: Status: ACTIVE | Noted: 2017-11-02

## 2017-11-02 PROBLEM — I50.22 CHRONIC SYSTOLIC HEART FAILURE (HCC): Status: RESOLVED | Noted: 2017-11-02 | Resolved: 2017-11-02

## 2017-11-02 PROCEDURE — 99215 OFFICE O/P EST HI 40 MIN: CPT | Performed by: INTERNAL MEDICINE

## 2017-11-02 ASSESSMENT — ENCOUNTER SYMPTOMS
NEUROLOGICAL NEGATIVE: 1
GASTROINTESTINAL NEGATIVE: 1
EYES NEGATIVE: 1
RESPIRATORY NEGATIVE: 1
CARDIOVASCULAR NEGATIVE: 1
CONSTITUTIONAL NEGATIVE: 1
PSYCHIATRIC NEGATIVE: 1
MUSCULOSKELETAL NEGATIVE: 1

## 2017-11-02 NOTE — PROGRESS NOTES
"Subjective:      Isaiah Coronel is a 77 y.o. male who presents with Hypertension (3 month follow up)  The patient is here for followup of chronic medical problems listed below. The patient is compliant with medications and having no side effects from them. Denies chest pain, abdominal pain, dyspnea, myalgias, or cough.     And the patient is also here for follow-up of his pulmonary function testing done in August 2017 which were abnormal and done because of exertional dyspnea and dyspnea when bending over. His PFTs showed moderate obstruction with urinary reversibility after bronchodilators. He is on no inhalers. He has not seen pulmonary doctor. His chest x-ray has shown some changes of COPD.      Patient Active Problem List    Diagnosis Date Noted   • Biliary tract imaging abnormality- dr sotelo; sp biliary sphincterotomy jan 2017 01/25/2017     Priority: High   • Abnormal PFTs (pulmonary function tests)-m aug 2017; irreversible obstruction 11/02/2017   • Outbursts of anger 11/02/2017   • Osteopenia of spine-DEXA scan 2014 06/01/2017   • Moderate episode of recurrent major depressive disorder (CMS-HCC) 12/01/2016   • Anticoagulated on warfarin- [facility], dr rhodes 11/24/2015   • Chronic atrial fibrillation (CMS-HCC)- DR PUGH; normal ECHO 2016, EF= 60 %, DR PUGH, [facility]; NORMAL CARDIAC CATH 2014 [facility] 10/14/2015   • Gastroesophageal reflux disease with esophagitis- fundoplication at [facility] 2014 04/09/2015   • Benign non-nodular prostatic hyperplasia with lower urinary tract symptoms- neg cysto 2014 04/09/2015   • Achalasia of esophagus- fundoplication at [facility] 2014- [facility] 02/26/2014   • Vitamin D deficiency disease 12/02/2013   • Mixed hyperlipidemia 06/19/2012   • Panlobular emphysema (CMS-HCC) 03/09/2012   • Essential hypertension 04/05/2011     Allergies   Allergen Reactions   • Other Environmental      \"My nose and eyes are bothered when I walk outside.\"     Outpatient Medications Prior to Visit   Medication Sig Dispense " "Refill   • pravastatin (PRAVACHOL) 40 MG tablet Take 1 tablet by mouth  daily 90 Tab 4   • omeprazole (PRILOSEC) 20 MG delayed-release capsule TAKE 1 CAPSULE BY MOUTH  TWICE DAILY 180 Cap 4   • warfarin (COUMADIN) 5 MG Tab TAKE 1 AND 1/2 TABLETS BY  MOUTH ONCE DAILY OR AS  DIRECTED BY COUMADIN CLINIC 135 Tab 1   • citalopram (CELEXA) 20 MG Tab Take 1 Tab by mouth every day. 90 Tab 4   • finasteride (PROSCAR) 5 MG Tab Take 1 Tab by mouth every day. 90 Tab 4   • lisinopril (PRINIVIL) 20 MG Tab Take 1 tablet by mouth  every day 90 Tab 4   • therapeutic multivitamin-minerals (THERAGRAN-M) Tab Take 1 Tab by mouth every day.     • Cholecalciferol (VITAMIN D) 2000 UNITS Cap Take  by mouth. 30 Cap    • metoprolol SR (TOPROL XL) 50 MG TABLET SR 24 HR Take 1 Tab by mouth every day. Dr khan to refill 90 Tab 1   • aspirin (ASA) 81 MG Chew Tab chewable tablet Take 1 Tab by mouth every day. 100 Tab    • docosahexanoic acid (OMEGA 3 FA) 1000 MG CAPS Take 1 Cap by mouth 3 times a day, with meals. 90 Cap 11   • cyanocobalamin (VITAMIN B-12) 500 MCG TABS Take 500 mcg by mouth 2 Times a Day.       No facility-administered medications prior to visit.                HPI    Review of Systems   Constitutional: Negative.    HENT: Negative.    Eyes: Negative.    Respiratory: Negative.    Cardiovascular: Negative.    Gastrointestinal: Negative.    Genitourinary: Negative.    Musculoskeletal: Negative.    Skin: Negative.    Neurological: Negative.    Endo/Heme/Allergies: Negative.    Psychiatric/Behavioral: Negative.           Objective:     /68   Pulse 76   Temp 36.7 °C (98.1 °F)   Resp 16   Ht 1.829 m (6' 0.01\")   Wt 87.1 kg (192 lb)   SpO2 95%   BMI 26.03 kg/m²      Physical Exam   Constitutional: He is oriented to person, place, and time. He appears well-developed and well-nourished. No distress.   HENT:   Head: Normocephalic and atraumatic.   Right Ear: External ear normal.   Left Ear: External ear normal.   Nose: Nose " normal.   Mouth/Throat: Oropharynx is clear and moist. No oropharyngeal exudate.   Eyes: Conjunctivae and EOM are normal. Pupils are equal, round, and reactive to light. Right eye exhibits no discharge. Left eye exhibits no discharge. No scleral icterus.   Neck: Normal range of motion. Neck supple. No JVD present. No tracheal deviation present. No thyromegaly present.   Cardiovascular: Normal rate, regular rhythm, normal heart sounds and intact distal pulses.  Exam reveals no gallop and no friction rub.    No murmur heard.  Pulmonary/Chest: Effort normal and breath sounds normal. No stridor. No respiratory distress. He has no wheezes. He has no rales. He exhibits no tenderness.   Abdominal: Soft. Bowel sounds are normal. He exhibits no distension and no mass. There is no tenderness. There is no rebound and no guarding.   Musculoskeletal: Normal range of motion. He exhibits no edema or tenderness.   Lymphadenopathy:     He has no cervical adenopathy.   Neurological: He is alert and oriented to person, place, and time. He has normal reflexes. He displays normal reflexes. He exhibits normal muscle tone. Coordination normal.   Skin: Skin is warm and dry. No rash noted. He is not diaphoretic. No erythema. No pallor.   Psychiatric: He has a normal mood and affect. His behavior is normal. Judgment and thought content normal.     Anticoagulation Visit on 10/09/2017   Component Date Value   • INR 10/09/2017 2.5       No results found for: HBA1C  Lab Results   Component Value Date/Time    SODIUM 140 05/25/2017 07:04 AM    POTASSIUM 4.2 05/25/2017 07:04 AM    CHLORIDE 104 05/25/2017 07:04 AM    CO2 26 05/25/2017 07:04 AM    GLUCOSE 68 05/25/2017 07:04 AM    BUN 22 05/25/2017 07:04 AM    CREATININE 0.83 05/25/2017 07:04 AM    ALKPHOSPHAT 63 05/25/2017 07:04 AM    ASTSGOT 17 05/25/2017 07:04 AM    ALTSGPT 11 05/25/2017 07:04 AM    TBILIRUBIN 1.4 05/25/2017 07:04 AM     Lab Results   Component Value Date/Time    INR 2.5  10/09/2017 07:57 AM    INR 2.3 09/11/2017 08:03 AM    INR 3.3 08/28/2017 08:08 AM     Lab Results   Component Value Date/Time    CHOLSTRLTOT 148 05/25/2017 07:04 AM    LDL 87 05/25/2017 07:04 AM    HDL 51 05/25/2017 07:04 AM    TRIGLYCERIDE 52 05/25/2017 07:04 AM       Lab Results   Component Value Date/Time    TESTOSTERONE 445 06/13/2012 08:46 AM     No results found for: TSH  Lab Results   Component Value Date/Time    FREET4 1.14 03/31/2014 10:33 AM    FREET4 0.91 03/30/2012 09:19 AM     Lab Results   Component Value Date/Time    URICACID 5.9 03/30/2012 09:19 AM     No components found for: VITB12  Lab Results   Component Value Date/Time    25HYDROXY 44 05/19/2016 09:52 AM    25HYDROXY 67 10/02/2014 07:15 AM               Assessment/Plan:     1. Abnormal PFTs (pulmonary function tests)-m aug 2017; irreversible obstruction    Probably secondary to moderate COPD. Will start maintenance inhaler    - REFERRAL TO PULMONOLOGY    2. Panlobular emphysema (CMS-HCC)   As above  - REFERRAL TO PULMONOLOGY  - Fluticasone Furoate-Vilanterol (BREO) 100-25 MCG/INH AEROSOL POWDER, BREATH ACTIVATED; Inhale 1 Puff by mouth 1 time daily as needed.  Dispense: 1 Each; Refill: 11    3. Outbursts of anger-new problem. Wants referral to counselor. Order. Seems occur particularly when driving and is tailgating.         - REFERRAL TO BEHAVIORAL HEALTH    4. Essential hypertension   Under good control. Continue same regimen.      5. Mixed hyperlipidemia Under good control. Continue same regimen.     6. Moderate episode of recurrent major depressive disorder (CMS-HCC) Under good control. Continue same regimen.   - REFERRAL TO BEHAVIORAL HEALTH    7. Gastroesophageal reflux disease with esophagitis- fundoplication at [facility] 2014 Under good control. Continue same regimen.     8. Anticoagulated on warfarin- a fib, dr rhodes Under good control. Continue same regimen.     9. Benign non-nodular prostatic hyperplasia with lower urinary tract symptoms-  neg cysto 2014 Under good control. Continue same regimen.     10. Chronic atrial fibrillation (CMS-HCC)- DR PUGH; normal ECHO 2016, EF= 60 %, DR PUGH, [facility]; NORMAL CARDIAC CATH 2014 [facility] Under good control. Continue same regimen.       11. Left ventricular systolic dysfunction problem has resolved. Removed from problem list. His last echo showed good LV function April 2016 as shown above. Will continue follow-up with his cardiologist at [facility]      40 minute face-to-face encounter took place today.  More than half of this time was spent in the coordination of care of the above problems, as well as counseling.

## 2017-11-22 ENCOUNTER — HOSPITAL ENCOUNTER (OUTPATIENT)
Dept: LAB | Facility: MEDICAL CENTER | Age: 77
End: 2017-11-22
Attending: INTERNAL MEDICINE
Payer: MEDICARE

## 2017-11-22 DIAGNOSIS — E78.2 MIXED HYPERLIPIDEMIA: ICD-10-CM

## 2017-11-22 LAB
ALBUMIN SERPL BCP-MCNC: 3.4 G/DL (ref 3.2–4.9)
ALBUMIN/GLOB SERPL: 1.1 G/DL
ALP SERPL-CCNC: 72 U/L (ref 30–99)
ALT SERPL-CCNC: 12 U/L (ref 2–50)
ANION GAP SERPL CALC-SCNC: 6 MMOL/L (ref 0–11.9)
AST SERPL-CCNC: 20 U/L (ref 12–45)
BASOPHILS # BLD AUTO: 0.5 % (ref 0–1.8)
BASOPHILS # BLD: 0.04 K/UL (ref 0–0.12)
BILIRUB SERPL-MCNC: 1.4 MG/DL (ref 0.1–1.5)
BUN SERPL-MCNC: 20 MG/DL (ref 8–22)
CALCIUM SERPL-MCNC: 9.2 MG/DL (ref 8.5–10.5)
CHLORIDE SERPL-SCNC: 103 MMOL/L (ref 96–112)
CHOLEST SERPL-MCNC: 139 MG/DL (ref 100–199)
CO2 SERPL-SCNC: 30 MMOL/L (ref 20–33)
CREAT SERPL-MCNC: 0.83 MG/DL (ref 0.5–1.4)
EOSINOPHIL # BLD AUTO: 0.12 K/UL (ref 0–0.51)
EOSINOPHIL NFR BLD: 1.4 % (ref 0–6.9)
ERYTHROCYTE [DISTWIDTH] IN BLOOD BY AUTOMATED COUNT: 48.9 FL (ref 35.9–50)
GFR SERPL CREATININE-BSD FRML MDRD: >60 ML/MIN/1.73 M 2
GLOBULIN SER CALC-MCNC: 3.2 G/DL (ref 1.9–3.5)
GLUCOSE SERPL-MCNC: 77 MG/DL (ref 65–99)
HCT VFR BLD AUTO: 49 % (ref 42–52)
HDLC SERPL-MCNC: 46 MG/DL
HGB BLD-MCNC: 16 G/DL (ref 14–18)
IMM GRANULOCYTES # BLD AUTO: 0.02 K/UL (ref 0–0.11)
IMM GRANULOCYTES NFR BLD AUTO: 0.2 % (ref 0–0.9)
LDLC SERPL CALC-MCNC: 73 MG/DL
LYMPHOCYTES # BLD AUTO: 1.47 K/UL (ref 1–4.8)
LYMPHOCYTES NFR BLD: 17.8 % (ref 22–41)
MCH RBC QN AUTO: 32.7 PG (ref 27–33)
MCHC RBC AUTO-ENTMCNC: 32.7 G/DL (ref 33.7–35.3)
MCV RBC AUTO: 100 FL (ref 81.4–97.8)
MONOCYTES # BLD AUTO: 0.66 K/UL (ref 0–0.85)
MONOCYTES NFR BLD AUTO: 8 % (ref 0–13.4)
NEUTROPHILS # BLD AUTO: 5.97 K/UL (ref 1.82–7.42)
NEUTROPHILS NFR BLD: 72.1 % (ref 44–72)
NRBC # BLD AUTO: 0 K/UL
NRBC BLD AUTO-RTO: 0 /100 WBC
PLATELET # BLD AUTO: 156 K/UL (ref 164–446)
PMV BLD AUTO: 11.7 FL (ref 9–12.9)
POTASSIUM SERPL-SCNC: 4 MMOL/L (ref 3.6–5.5)
PROT SERPL-MCNC: 6.6 G/DL (ref 6–8.2)
RBC # BLD AUTO: 4.9 M/UL (ref 4.7–6.1)
SODIUM SERPL-SCNC: 139 MMOL/L (ref 135–145)
T4 FREE SERPL-MCNC: 1.07 NG/DL (ref 0.53–1.43)
TRIGL SERPL-MCNC: 100 MG/DL (ref 0–149)
TSH SERPL DL<=0.005 MIU/L-ACNC: 2.52 UIU/ML (ref 0.3–3.7)
WBC # BLD AUTO: 8.3 K/UL (ref 4.8–10.8)

## 2017-11-22 PROCEDURE — 36415 COLL VENOUS BLD VENIPUNCTURE: CPT

## 2017-11-22 PROCEDURE — 80061 LIPID PANEL: CPT

## 2017-11-22 PROCEDURE — 80053 COMPREHEN METABOLIC PANEL: CPT

## 2017-11-22 PROCEDURE — 84443 ASSAY THYROID STIM HORMONE: CPT

## 2017-11-22 PROCEDURE — 85025 COMPLETE CBC W/AUTO DIFF WBC: CPT

## 2017-11-22 PROCEDURE — 84439 ASSAY OF FREE THYROXINE: CPT

## 2017-11-27 ENCOUNTER — ANTICOAGULATION VISIT (OUTPATIENT)
Dept: MEDICAL GROUP | Facility: PHYSICIAN GROUP | Age: 77
End: 2017-11-27
Payer: MEDICARE

## 2017-11-27 VITALS — SYSTOLIC BLOOD PRESSURE: 149 MMHG | DIASTOLIC BLOOD PRESSURE: 91 MMHG | HEART RATE: 63 BPM

## 2017-11-27 DIAGNOSIS — I48.20 CHRONIC ATRIAL FIBRILLATION (HCC): ICD-10-CM

## 2017-11-27 LAB — INR PPP: 2.6 (ref 2–3.5)

## 2017-11-27 PROCEDURE — 85610 PROTHROMBIN TIME: CPT | Performed by: NURSE PRACTITIONER

## 2017-11-27 NOTE — PROGRESS NOTES
Anticoagulation Summary  As of 11/27/2017    INR goal:   2.0-3.0   TTR:   57.8 % (2.4 y)   Today's INR:   2.6   Maintenance plan:   5 mg (5 mg x 1) on Mon, Wed, Fri; 7.5 mg (5 mg x 1.5) all other days   Weekly total:   45 mg   Plan last modified:   Kelvin Guy, PharmD (6/16/2017)   Next INR check:   1/15/2018   Target end date:   Indefinite    Indications    A-fib- chronic warfarin rx (Resolved) [I48.91]  Chronic atrial fibrillation (CMS-HCC)- DR PUGH; normal ECHO 2016  EF= 60 %  DR PUGH  [facility]; NORMAL CARDIAC CATH 2014 [facility] [I48.2]             Anticoagulation Episode Summary     INR check location:   [facility]    Preferred lab:       Send INR reminders to:       Comments:         Anticoagulation Care Providers     Provider Role Specialty Phone number    Duncan Tierney M.D. [facility] Family Medicine [phone]    Alonso Pugh M.D.  Cardiology [phone]    Mikhali Camacho, PharmD Responsible          Anticoagulation Patient Findings  Patient Findings     Negatives:   Signs/symptoms of thrombosis, Signs/symptoms of bleeding, Laboratory test error suspected, Change in health, Change in alcohol use, Change in activity, Upcoming invasive procedure, Emergency department visit, Upcoming dental procedure, Missed doses, Extra doses, Change in medications, Change in diet/appetite, Hospital admission, Bruising, Other complaints        HPI:   Isaiah Coronel seen in clinic today, on anticoagulation therapy with warfarin for stroke prevention due to history of atrial fibrillation  CHADS-VASc = at least 3  (adjusted ischemic stroke risk/year:  3.2%, which is moderate risk)    Previous INR 2.5  on 10-9-17    Does patient have any changes to current medical/health status since last appt (Y/N):  NO  Does patient have any signs/symptoms of bleeding and/or thrombosis since the last appt (Y/N):  NO  Does patient have any interval changes to diet or medications since last appt (Y/N):  NO  Are there any complications or  "cost restrictions with current therapy (Y/N):  NO      Vitals:  /91  HR 63    Weight  Patient declines   Height   6' 00\"     Asssessment:      INR therapeutic at 2.6, therefore decreasing patient's risk of stroke secondary to a-fib and/or bleeding/bruising event.   Reason(s) for out of range INR today:  n/a      Plan:  Pt is to continue with current warfarin dosing regimen as this has maintained patient in therapeutic range for some time thus preventing stroke and/or bleeding event.     Follow up:  Because warfarin is a high risk medication and current CHEST guidelines recommend regular monitoring intervals (few days up to 12 weeks), will have patient return to clinic in 7 weeks to recheck INR.    Kelvin Guy, PharmD    "

## 2017-11-30 ENCOUNTER — TELEPHONE (OUTPATIENT)
Dept: MEDICAL GROUP | Age: 77
End: 2017-11-30

## 2017-11-30 NOTE — TELEPHONE ENCOUNTER
ESTABLISHED PATIENT PRE-VISIT PLANNING     Note: Patient will not be contacted if there is no indication to call.     1.  Reviewed notes from the last few office visits within the medical group: Yes    2.  If any orders were placed at last visit or intended to be done for this visit (i.e. 6 mos follow-up), do we have Results/Consult Notes?        •  Labs - Labs ordered, completed on 11/22/17 and results are in chart.   Note: If patient appointment is for lab review and patient did not complete labs, check with provider if OK to reschedule patient until labs completed.       •  Imaging - Imaging was not ordered at last office visit.       •  Referrals - Referral ordered, patient has NOT been seen.    3. Is this appointment scheduled as a Hospital Follow-Up? No    4.  Immunizations were updated in Epic using WebIZ?: Epic matches WebIZ       •  Web Iz Recommendations: TDAP and ZOSTAVAX (Shingles)    5.  Patient is due for the following Health Maintenance Topics:   Health Maintenance Due   Topic Date Due   • Annual Wellness Visit  1940   • IMM DTaP/Tdap/Td Vaccine (1 - Tdap) 01/11/1960   • IMM ZOSTER VACCINE  01/11/2001       - Patient is up-to-date on all Health Maintenance topics. No records have been requested at this time.    6.  Patient was NOT informed to arrive 15 min prior to their scheduled appointment and bring in their medication bottles.

## 2017-12-01 ENCOUNTER — OFFICE VISIT (OUTPATIENT)
Dept: MEDICAL GROUP | Age: 77
End: 2017-12-01
Payer: MEDICARE

## 2017-12-01 VITALS
TEMPERATURE: 98.4 F | BODY MASS INDEX: 25.6 KG/M2 | HEIGHT: 72 IN | HEART RATE: 70 BPM | SYSTOLIC BLOOD PRESSURE: 100 MMHG | OXYGEN SATURATION: 95 % | WEIGHT: 189 LBS | DIASTOLIC BLOOD PRESSURE: 60 MMHG

## 2017-12-01 DIAGNOSIS — Z79.01 ANTICOAGULATED ON WARFARIN: ICD-10-CM

## 2017-12-01 DIAGNOSIS — I10 ESSENTIAL HYPERTENSION: ICD-10-CM

## 2017-12-01 DIAGNOSIS — F33.1 MODERATE EPISODE OF RECURRENT MAJOR DEPRESSIVE DISORDER (HCC): ICD-10-CM

## 2017-12-01 DIAGNOSIS — E78.5 HYPERLIPIDEMIA, UNSPECIFIED HYPERLIPIDEMIA TYPE: ICD-10-CM

## 2017-12-01 DIAGNOSIS — N40.1 BENIGN NON-NODULAR PROSTATIC HYPERPLASIA WITH LOWER URINARY TRACT SYMPTOMS: ICD-10-CM

## 2017-12-01 DIAGNOSIS — K21.00 GASTROESOPHAGEAL REFLUX DISEASE WITH ESOPHAGITIS: ICD-10-CM

## 2017-12-01 DIAGNOSIS — E78.2 MIXED HYPERLIPIDEMIA: ICD-10-CM

## 2017-12-01 DIAGNOSIS — G44.209 TENSION HEADACHE: ICD-10-CM

## 2017-12-01 DIAGNOSIS — Z91.81 RISK FOR FALLS: ICD-10-CM

## 2017-12-01 DIAGNOSIS — K43.9 HERNIA OF ABDOMINAL WALL: ICD-10-CM

## 2017-12-01 DIAGNOSIS — J43.1 PANLOBULAR EMPHYSEMA (HCC): ICD-10-CM

## 2017-12-01 DIAGNOSIS — I48.20 CHRONIC ATRIAL FIBRILLATION (HCC): ICD-10-CM

## 2017-12-01 PROCEDURE — 99215 OFFICE O/P EST HI 40 MIN: CPT | Performed by: INTERNAL MEDICINE

## 2017-12-01 RX ORDER — PRAVASTATIN SODIUM 40 MG
TABLET ORAL
Qty: 90 TAB | Refills: 4 | Status: SHIPPED | OUTPATIENT
Start: 2017-12-01

## 2017-12-01 ASSESSMENT — ENCOUNTER SYMPTOMS
RESPIRATORY NEGATIVE: 1
CONSTITUTIONAL NEGATIVE: 1
MUSCULOSKELETAL NEGATIVE: 1
PSYCHIATRIC NEGATIVE: 1
NEUROLOGICAL NEGATIVE: 1
GASTROINTESTINAL NEGATIVE: 1
EYES NEGATIVE: 1
CARDIOVASCULAR NEGATIVE: 1

## 2017-12-02 NOTE — PROGRESS NOTES
Subjective:      Isaiah Coronel is a 77 y.o. male who presents with Hyperlipidemia (evaluation on blood work results)  The patient is here for followup of chronic medical problems listed below. The patient is compliant with medications and having no side effects from them. Denies chest pain, abdominal pain, dyspnea, myalgias, or cough.   Patient Active Problem List    Diagnosis Date Noted   • Biliary tract imaging abnormality- dr sotelo; sp biliary sphincterotomy jan 2017 01/25/2017     Priority: High   • Tension headache 12/01/2017   • Hernia of abdominal wall 12/01/2017   • Abnormal PFTs (pulmonary function tests)-m aug 2017; irreversible obstruction 11/02/2017   • Outbursts of anger 11/02/2017   • Osteopenia of spine-DEXA scan 2014 06/01/2017   • Moderate episode of recurrent major depressive disorder (CMS-HCC) 12/01/2016   • Anticoagulated on warfarin- mely mad[place], dr rhodes 11/24/2015   • Chronic atrial fibrillation (CMS-HCC)- DR PUGH; normal ECHO 2016, EF= 60 %, DR PUGH, [facility]; NORMAL CARDIAC CATH 2014 [facility] 10/14/2015   • Gastroesophageal reflux disease with esophagitis- fundoplication at [facility] 2014 04/09/2015   • Benign non-nodular prostatic hyperplasia with lower urinary tract symptoms- neg cysto 2014 04/09/2015   • Achalasia of esophagus- fundoplication at [facility] 2014- [facility] 02/26/2014   • Vitamin D deficiency disease 12/02/2013   • Mixed hyperlipidemia 06/19/2012   • Panlobular emphysema (CMS-HCC) 03/09/2012   • Essential hypertension 04/05/2011   l  12/1/2017  9:36 AM  Outpatient Medications Prior to Visit   Medication Sig Dispense Refill   • omeprazole (PRILOSEC) 20 MG delayed-release capsule TAKE 1 CAPSULE BY MOUTH  TWICE DAILY 180 Cap 4   • warfarin (COUMADIN) 5 MG Tab TAKE 1 AND 1/2 TABLETS BY  MOUTH ONCE DAILY OR AS  DIRECTED BY COUMADIN CLINIC 135 Tab 1   • citalopram (CELEXA) 20 MG Tab Take 1 Tab by mouth every day. 90 Tab 4   • finasteride (PROSCAR) 5 MG Tab Take 1 Tab by mouth every day. 90 Tab 4   •  "lisinopril (PRINIVIL) 20 MG Tab Take 1 tablet by mouth  every day 90 Tab 4   • therapeutic multivitamin-minerals (THERAGRAN-M) Tab Take 1 Tab by mouth every day.     • Cholecalciferol (VITAMIN D) 2000 UNITS Cap Take  by mouth. 30 Cap    • metoprolol SR (TOPROL XL) 50 MG TABLET SR 24 HR Take 1 Tab by mouth every day. Dr khan to refill 90 Tab 1   • aspirin (ASA) 81 MG Chew Tab chewable tablet Take 1 Tab by mouth every day. 100 Tab    • docosahexanoic acid (OMEGA 3 FA) 1000 MG CAPS Take 1 Cap by mouth 3 times a day, with meals. 90 Cap 11   • cyanocobalamin (VITAMIN B-12) 500 MCG TABS Take 500 mcg by mouth 2 Times a Day.     • Fluticasone Furoate-Vilanterol (BREO) 100-25 MCG/INH AEROSOL POWDER, BREATH ACTIVATED Inhale 1 Puff by mouth 1 time daily as needed. 1 Each 11   • pravastatin (PRAVACHOL) 40 MG tablet Take 1 tablet by mouth  daily 90 Tab 4     No facility-administered medications prior to visit.      Allergies   Allergen Reactions   • Other Environmental      \"My nose and eyes are bothered when I walk outside.\"               HPI    Review of Systems   Constitutional: Negative.    HENT: Negative.    Eyes: Negative.    Respiratory: Negative.    Cardiovascular: Negative.    Gastrointestinal: Negative.    Genitourinary: Negative.    Musculoskeletal: Negative.    Skin: Negative.    Neurological: Negative.    Endo/Heme/Allergies: Negative.    Psychiatric/Behavioral: Negative.           Objective:     /60   Pulse 70   Temp 36.9 °C (98.4 °F)   Ht 1.829 m (6' 0.01\")   Wt 85.7 kg (189 lb)   SpO2 95%   BMI 25.63 kg/m²      Physical Exam   Constitutional: He is oriented to person, place, and time. He appears well-developed and well-nourished. No distress.   HENT:   Head: Normocephalic and atraumatic.   Right Ear: External ear normal.   Left Ear: External ear normal.   Nose: Nose normal.   Mouth/Throat: Oropharynx is clear and moist. No oropharyngeal exudate.   Eyes: Conjunctivae and EOM are normal. Pupils are " equal, round, and reactive to light. Right eye exhibits no discharge. Left eye exhibits no discharge. No scleral icterus.   Neck: Normal range of motion. Neck supple. No JVD present. No tracheal deviation present. No thyromegaly present.   Cardiovascular: Normal rate, regular rhythm, normal heart sounds and intact distal pulses.  Exam reveals no gallop and no friction rub.    No murmur heard.  Pulmonary/Chest: Effort normal and breath sounds normal. No stridor. No respiratory distress. He has no wheezes. He has no rales. He exhibits no tenderness.   Abdominal: Soft. Bowel sounds are normal. He exhibits no distension and no mass. There is no tenderness. There is no rebound and no guarding.   Musculoskeletal: Normal range of motion. He exhibits no edema or tenderness.   Lymphadenopathy:     He has no cervical adenopathy.   Neurological: He is alert and oriented to person, place, and time. He has normal reflexes. He displays normal reflexes. He exhibits normal muscle tone. Coordination normal.   Skin: Skin is warm and dry. No rash noted. He is not diaphoretic. No erythema. No pallor.   Psychiatric: He has a normal mood and affect. His behavior is normal. Judgment and thought content normal.     Anticoagulation Visit on 11/27/2017   Component Date Value   • INR 11/27/2017 2.6    Hospital Outpatient Visit on 11/22/2017   Component Date Value   • TSH 11/22/2017 2.520    • Free T-4 11/22/2017 1.07    • Sodium 11/22/2017 139    • Potassium 11/22/2017 4.0    • Chloride 11/22/2017 103    • Co2 11/22/2017 30    • Anion Gap 11/22/2017 6.0    • Glucose 11/22/2017 77    • Bun 11/22/2017 20    • Creatinine 11/22/2017 0.83    • Calcium 11/22/2017 9.2    • AST(SGOT) 11/22/2017 20    • ALT(SGPT) 11/22/2017 12    • Alkaline Phosphatase 11/22/2017 72    • Total Bilirubin 11/22/2017 1.4    • Albumin 11/22/2017 3.4    • Total Protein 11/22/2017 6.6    • Globulin 11/22/2017 3.2    • A-G Ratio 11/22/2017 1.1    • Cholesterol,Tot 11/22/2017  139    • Triglycerides 11/22/2017 100    • HDL 11/22/2017 46    • LDL 11/22/2017 73    • WBC 11/22/2017 8.3    • RBC 11/22/2017 4.90    • Hemoglobin 11/22/2017 16.0    • Hematocrit 11/22/2017 49.0    • MCV 11/22/2017 100.0*   • MCH 11/22/2017 32.7    • MCHC 11/22/2017 32.7*   • RDW 11/22/2017 48.9    • Platelet Count 11/22/2017 156*   • MPV 11/22/2017 11.7    • Neutrophils-Polys 11/22/2017 72.10*   • Lymphocytes 11/22/2017 17.80*   • Monocytes 11/22/2017 8.00    • Eosinophils 11/22/2017 1.40    • Basophils 11/22/2017 0.50    • Immature Granulocytes 11/22/2017 0.20    • Nucleated RBC 11/22/2017 0.00    • Neutrophils (Absolute) 11/22/2017 5.97    • Lymphs (Absolute) 11/22/2017 1.47    • Monos (Absolute) 11/22/2017 0.66    • Eos (Absolute) 11/22/2017 0.12    • Baso (Absolute) 11/22/2017 0.04    • Immature Granulocytes (a* 11/22/2017 0.02    • NRBC (Absolute) 11/22/2017 0.00    • GFR If  11/22/2017 >60    • GFR If Non  [employer]meri* 11/22/2017 >60       No results found for: HBA1C  Lab Results   Component Value Date/Time    SODIUM 139 11/22/2017 10:56 AM    POTASSIUM 4.0 11/22/2017 10:56 AM    CHLORIDE 103 11/22/2017 10:56 AM    CO2 30 11/22/2017 10:56 AM    GLUCOSE 77 11/22/2017 10:56 AM    BUN 20 11/22/2017 10:56 AM    CREATININE 0.83 11/22/2017 10:56 AM    ALKPHOSPHAT 72 11/22/2017 10:56 AM    ASTSGOT 20 11/22/2017 10:56 AM    ALTSGPT 12 11/22/2017 10:56 AM    TBILIRUBIN 1.4 11/22/2017 10:56 AM     Lab Results   Component Value Date/Time    INR 2.6 11/27/2017 03:49 PM    INR 2.5 10/09/2017 07:57 AM    INR 2.3 09/11/2017 08:03 AM     Lab Results   Component Value Date/Time    CHOLSTRLTOT 139 11/22/2017 10:56 AM    LDL 73 11/22/2017 10:56 AM    HDL 46 11/22/2017 10:56 AM    TRIGLYCERIDE 100 11/22/2017 10:56 AM       Lab Results   Component Value Date/Time    TESTOSTERONE 445 06/13/2012 08:46 AM     No results found for: TSH  Lab Results   Component Value Date/Time    FREET4 1.07 11/22/2017 10:56 AM     FREET4 1.14 03/31/2014 10:33 AM     Lab Results   Component Value Date/Time    URICACID 5.9 03/30/2012 09:19 AM     No components found for: VITB12  Lab Results   Component Value Date/Time    25HYDROXY 44 05/19/2016 09:52 AM    25HYDROXY 67 10/02/2014 07:15 AM                 Assessment/Plan:     1. Hyperlipidemia, unspecified hyperlipidemia type   Under good control. Continue same regimen.  - pravastatin (PRAVACHOL) 40 MG tablet; Take 1 tablet by mouth  daily  Dispense: 90 Tab; Refill: 4  - TSH; Future  - COMP METABOLIC PANEL; Future  - LIPID PROFILE; Future  - CBC WITH DIFFERENTIAL; Future    2. Tension headache      Under good control. Continue same regimen. Tylenol when necessary.    3. Essential hypertension     Under good control. Continue same regimen.    4. Panlobular emphysema (CMS-HCC)    Under good control. Continue same regimen.  - Fluticasone Furoate-Vilanterol (BREO) 100-25 MCG/INH AEROSOL POWDER, BREATH ACTIVATED; Inhale 1 Puff by mouth every day.  Dispense: 3 Each; Refill: 4    5. Mixed hyperlipidemia    Under good control. Continue same regimen.  6. Gastroesophageal reflux disease with esophagitis- fundoplication at [facility] 2014      Under good control. Continue same regimen.    7. Benign non-nodular prostatic hyperplasia with lower urinary tract symptoms- neg cysto 2014    Under good control. Continue same regimen.  8. Chronic atrial fibrillation (CMS-HCC)- DR PUGH; normal ECHO 2016, EF= 60 %, DR PUGH, [facility]; NORMAL CARDIAC CATH 2014 [facility]    Under good control. Continue same regimen.    9. Anticoagulated on warfarin- a fib, dr rhodes    Under good control. Continue same regimen.    10. Moderate episode of recurrent major depressive disorder (CMS-HCC)    Under good control. Continue same regimen.  11. Hernia of abdominal wall   Patient has a small 2 cm diameter upper incisional ventral hernia easily reducible. Refer to general surgery for repair  - REFERRAL TO GENERAL SURGERY    12. Risk for falls     Under good control. Continue same regimen.  - Patient identified as fall risk.  Appropriate orders and counseling given.      40 minute face-to-face encounter took place today.  More than half of this time was spent in the coordination of care of the above problems, as well as counseling.

## 2017-12-11 ENCOUNTER — OFFICE VISIT (OUTPATIENT)
Dept: BEHAVIORAL HEALTH | Facility: PHYSICIAN GROUP | Age: 77
End: 2017-12-11
Payer: MEDICARE

## 2017-12-11 DIAGNOSIS — F33.1 MODERATE EPISODE OF RECURRENT MAJOR DEPRESSIVE DISORDER (HCC): ICD-10-CM

## 2017-12-11 PROCEDURE — 90791 PSYCH DIAGNOSTIC EVALUATION: CPT | Performed by: SOCIAL WORKER

## 2017-12-11 NOTE — BH THERAPY
[facility]  INITIAL ASSESSMENT    Name: Isaiah Coronel  MRN: [id number]  : 1940  Age: 77 y.o.  Date of assessment: 2017  PCP: Avery Nelson M.D.  Persons in attendance: Patient  Total session time: 45 minutes      CHIEF COMPLAINT AND HISTORY OF PRESENTING PROBLEM:  (as stated by Patient): Patient reports recent increase in depressive symptoms including low energy, increased agitation, poor concentration and problems with memory.  Also reports negative self talk with anger at self.   Isaiah Coronel is a 77 y.o., White male referred for assessment by Avery Nelson M.D..  Primary presenting issue includes   Chief Complaint   Patient presents with   • Depression   .     FAMILY/SOCIAL HISTORY  Current living situation/household members: Patient lives in a home owned by him and his wife of nine years.    Relevant family history/structure/dynamics: Mr. Coronel reports having been born and raised in [place] with three brothers, two who are .  Says he had a wonderful childhood with no abuse.  He has been  three times previously and is currently  to his fourth wife for nine years.  Says she is his high school sweetheart and they are very happy together.  He has one adult daughter in [place] and has a close relationship with her.   Current family/social stressors:Denies family stress though reports experiencing some anxiety and depression related to aging and medical issues.  Also worries about finances though also acknowledges money is not a problem.   Quality/quantity of current family and/or social support: Reports strong family support from wife, daughter, and wife's family.  Does patient/parent report a family history of behavioral health issues, diagnoses, or treatment? No  History reviewed. No pertinent family history.     BEHAVIORAL HEALTH TREATMENT HISTORY  Does patient/parent report a history of prior behavioral health treatment for patient? Yes:    Dates Level  of Care Facilty/Provider Diagnosis/Problem Medications   Nid 1990's` OP Anger management                                                                            History of untreated behavioral health issues identified? No    MEDICAL HISTORY  Primary care behavioral health screenings: Patient Health Questionaire                                     If depressive symptoms identified deferred to follow up visit unless specifically addressed in assesment and plan.    Interpretation of PHQ-9 Total Score   Score Severity   1-4 No Depression   5-9 Mild Depression   10-14 Moderate Depression   15-19 Moderately Severe Depression   20-27 Severe Depression       Past medical/surgical history: Past Medical History:   Diagnosis Date   • Angina    • Anticoagulant long-term use    • Arthritis     hips   • Atrial fibrillation, controlled (CMS-HCC)    • Backpain     lower back pain and right side   • Bowel habit changes     diarrhea   • Breath shortness    • Bronchitis 1/2017   • Cancer (CMS-HCC)     skin cancer on nose and shoulder 2006   • Cataract     OU   • Cold 1/2017   • Dental disorder     partial lower   • EMPHYSEMA    • GERD (gastroesophageal reflux disease)    • Glaucoma     OU   • Hernia of unspecified site of abdominal cavity without mention of obstruction or gangrene    • Hiatus hernia syndrome    • High cholesterol    • Hypertension    • Indigestion    • Left ventricular systolic dysfunction-EF=45-50% ECHO 2012- normal cardiac cath 2014- dr khan at [facility] 6/19/2012   • Pneumonia     in past   • Psychiatric problem     anxiety 2 years ago not any more   • Sleep apnea     no cpap- no testing      Past Surgical History:   Procedure Laterality Date   • GASTROSCOPY N/A 1/25/2017    Procedure: GASTROSCOPY WITH DUODENAL BIOPSY;  Surgeon: Bradley Carreon M.D.;  Location: SURGERY [facility];  Service:    • EGD W/ENDOSCOPIC ULTRASOUND N/A 1/25/2017    Procedure: EGD W/ENDOSCOPIC ULTRASOUND - CURVILINER UPPER  PANCREATOBILIARY;  Surgeon: Bradley Carreon M.D.;  Location: SURGERY [facility];  Service:    • ERCP W/REMOVAL CALCULUS N/A 1/25/2017    Procedure: ERCP W/REMOVAL CALCULUS;  Surgeon: Bradley Carreon M.D.;  Location: SURGERY [facility];  Service:    • FUNDOPLICATON  2014    open nissen   • INGUINAL HERNIA REPAIR BILATERAL  12/19/2012    Performed by Monico Cox M.D. at SURGERY [facility]   • INGUINAL HERNIA REPAIR  2005    bilateral   • TRANS URETHRAL RESECTION PROSTATE  2000    B[facility]   • ADRENAL TISSUE BIOPSY/ ADRENALECTOMY Right 19902     with repair of hepatic attachment; neg for ca   • CHOLECYSTECTOMY  1990s   • HIATAL HERNIA REPAIR  1990, 2014   • LESION EXCISION GENERAL      removal of skin CA        Medication Allergies:  Other environmental   Medical history provided by patient during current evaluation: n/a    Patient reports last physical exam: !!/29/17  Does patient/parent report any history of or current developmental concerns? No  Does patient/parent report nutritional concerns? No  Does patient/parent report change in appetite or weight loss/gain? No  Does patient/parent report history of eating disorder symptoms? No  Does patient/parent report dental problem? No  Does patient/parent report physical pain? No   Indicate if pain is acute or chronic, and location: n/a   Pain scale rating:       Does patient/parent report functional impact of medical, developmental, or pain issues?   yes    EDUCATIONAL/LEARNING HISTORY  Is patient currently enrolled in a school/educational program?   No:   Highest grade level completed: Bachelor of Science in Business from [employer]   School performance/functioning: n/a  History of Special Education/repeated grades/learning issues: no  Preferred learning style: Not reported  Current learning needs (large print, language barrier, etc):  None      EMPLOYMENT/RESOURCES  Is the patient currently employed? 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 Yes  Does patient identify impact of presenting issue on work functioning? No  Work or income-related stressors:  Reports stress due to not having ongoing income beyond penitentiary funds.      HISTORY:  Does patient report current or past enlistment? Yes [employer] 4 years and [employer] for 2 years   [If yes, complete below items]  Does patient report history of exposure to combat? No  Does patient report history of  sexual trauma? No  Does patient report other -related stressors? No    SPIRITUAL/CULTURAL/IDENTITY:  What are the patient’s/family’s spiritual beliefs or practices? Says he does believe in God though no identified Rastafari.  What is the patient’s cultural or ethnic background/identity?   How does the patient identify their sexual orientation? Heterosexual  How does the patient identify their gender? Male  Does the patient identify any spiritual/cultural/identity factors as relevant to the presenting issue? No    LEGAL HISTORY  Has the patient ever been involved with juvenile, adult, or family legal systems? No   [If yes, trigger section below:]  Does patient report ever being a victim of a crime?  No  Does patient report involvement in any current legal issues?  No  Does patient report ever being arrested or committing a crime? No  Does patient report any current agency (parole/probation/CPS/) involvement? No    ABUSE/NEGLECT/TRAUMA SCREENING  Does patient report feeling “unsafe” in his/her home, or afraid of anyone? No  Does patient report any history of physical, sexual, or emotional abuse? No  Does parent or significant other report any of the above? No  Is there evidence of neglect by self? No  Is there evidence of neglect by a caregiver? No  Does the patient/parent report any history of CPS/APS/police involvement related to suspected abuse/neglect or domestic violence? No  Does the  patient/parent report any other history of potentially traumatic life events? No  Based on the information provided during the current assessment, is a mandated report of suspected abuse/neglect being made?  No     SAFETY ASSESSMENT - SELF  Does patient acknowledge current or past symptoms of dangerousness to self? No  Does parent/significant other report patient has current or past symptoms of dangerousness to self? No      Recent change in frequency/specificity/intensity of suicidal thoughts or self-harm behavior? No  Current access to firearms, medications, or other identified means of suicide/self-harm? No  If yes, willing to restrict access to means of suicide/self-harm? n/a  Protective factors present: Future-oriented, Good impulse control, Hopefulness, Optimism, Positive coping skills, Positive self-efficacy, Spiritual beliefs/practices, Strong family connections and Strong socia/community connections    Current Suicide Risk: Not applicable  Crisis Safety Plan completed and copy given to patient: No    SAFETY ASSESSMENT - OTHERS  Does paor past symptoms of aggressive behavior or risk to others? No  Does parent/significant othtient acknowledge current or past symptoms of aggressive behavior or risk to others? No  Does parent/significant other report patient has current or past symptoms of aggressive behavior or risk to others? No    Recent change in frequency/specificity/intensity of thoughts or threats to harm others? No  Current access to firearms/other identified means of harm? No  If yes, willing to restrict access to weapons/means of harm? n/a  Protective factors present: Well-developed sense of empathy, Positive impulse-control and Stable relationships    Current Homicide Risk:  Not applicable  Crisis Safety Plan completed and copy given to patient? No  Based on information provided during the current assessment, is a mandated “duty to warn” being exercised? No    SUBSTANCE USE/ADDICTION HISTORY  [] Not  applicable - patient 10 years of age or younger    Is there a family history of substance use/addiction? No  Does patient acknowledge or parent/significant other report use of/dependence on substances? No  Last time patient used alcohol: Not reported  Within the past week? No  Last time patient used marijuana: n/a  Within the past month? No  Any other street drugs ever tried even once? No  Any use of prescription medications/pills without a prescription, or for reasons others than originally prescribed?  No  Any other addictive behavior reported (gambling, shopping, sex)? No     Drug History:  Amphetamine:      Cannibis:      Cocaine:      Ecstasy:      Hallucinogen:      Inhalant:       Opiate:      Other:      Sedative:           What consequences does the patient associate with any of the above substance use and or addictive behaviors? None    Patient’s motivation/readiness for change: n/a    [] Patient denies use of any substance/addictive behaviors    STRENGTHS/ASSETS  Strengths Identified by interviewer: Insight into problems, Evidence of good judgement, Self-awareness, Family suppport, Social support, Stable relationships, Optimism, Problem-solving skills, Cognitive flexibility and Social skills  Strengths Identified by patient: Says he is intelligent, honest, honorable, able to work with others, and a good friend.    MENTAL STATUS/OBSERVATIONS   Participation: Active verbal participation, Attentive and Engaged  Grooming: Neat  Orientation:Alert and Fully Oriented   Behavior: Calm  Eye contact: Good   Mood:Depressed  Affect:Constricted  Thought process: Logical  Thought content:  Within normal limits  Speech: Rate within normal limits  Perception: Within normal limits  Memory: No gross evidence of memory deficits     Insight: Adequate  Judgment:  Adequate  Other:    Family/couple interaction observations:     RESULTS OF SCREENING MEASURES:  [] Not applicable  Measure:   Score:     Measure:   Score:        CLINICAL FORMULATION:   Patient is a 77 year old  male who appears to be of stated age who reports increase in depressive symptoms recently including low energy, wakes feeling tired despite sleeping 6-8 hours, poor concentration, impaired memory, agitation and generally feeling “depressed.  Denies current and history of suicidal ideation or attempt.  Denies current and history of HI or thoughts of harming others.  Stream of mental activity is logical, relevant, coherent, and is future oriented.  Mood and affect are currently stable.  Reports bouts of anxiety.         DIAGNOSTIC IMPRESSION(S):No diagnosis found.  Major Depressive Disorder, Recurrent, Moderate  IDENTIFIED NEEDS/PLAN:  [If any of these marked, trigger DISPOSITION list]  Mood/anxiety  Actively being addressed by [facility]     Patient will practice techniques to increase awareness of disruptive thoughts and will challenge and replace those thoughts with accurate and more productive interpretation of situations and experiences.    Does patient express agreement with the above plan? Yes     Referral appointment(s) scheduled? Yes       Corinne G. Taylor, L.C.S.W.

## 2017-12-13 ENCOUNTER — TELEPHONE (OUTPATIENT)
Dept: VASCULAR LAB | Facility: MEDICAL CENTER | Age: 77
End: 2017-12-13

## 2017-12-13 NOTE — TELEPHONE ENCOUNTER
Patient being scheduled for a GI procedure  Patient with A. fib andCHADS-VASC score = 3  Will hold warfarin for 5 days prior to procedure at request of gastroenterologist  No indication for bridge therapy  Clinical pharmacist to arrange    Michael J. Bloch, MD  Anticoagulation Center    CC: Dr. Luciano

## 2017-12-18 ENCOUNTER — OFFICE VISIT (OUTPATIENT)
Dept: BEHAVIORAL HEALTH | Facility: PHYSICIAN GROUP | Age: 77
End: 2017-12-18
Payer: MEDICARE

## 2017-12-18 DIAGNOSIS — F33.1 MODERATE EPISODE OF RECURRENT MAJOR DEPRESSIVE DISORDER (HCC): ICD-10-CM

## 2017-12-18 PROCEDURE — 90834 PSYTX W PT 45 MINUTES: CPT | Performed by: SOCIAL WORKER
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
## 2017-12-21 ENCOUNTER — ANTICOAGULATION MONITORING (OUTPATIENT)
Dept: VASCULAR LAB | Facility: MEDICAL CENTER | Age: 77
End: 2017-12-21

## 2017-12-21 DIAGNOSIS — I48.20 CHRONIC ATRIAL FIBRILLATION (HCC): ICD-10-CM

## 2017-12-28 ENCOUNTER — OFFICE VISIT (OUTPATIENT)
Dept: BEHAVIORAL HEALTH | Facility: PHYSICIAN GROUP | Age: 77
End: 2017-12-28
Payer: MEDICARE

## 2017-12-28 DIAGNOSIS — F33.41 RECURRENT MAJOR DEPRESSIVE DISORDER, IN PARTIAL REMISSION (HCC): ICD-10-CM

## 2017-12-28 PROCEDURE — 90834 PSYTX W PT 45 MINUTES: CPT | Performed by: SOCIAL WORKER
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
## 2018-01-12 ENCOUNTER — OFFICE VISIT (OUTPATIENT)
Dept: BEHAVIORAL HEALTH | Facility: PHYSICIAN GROUP | Age: 78
End: 2018-01-12
Payer: MEDICARE

## 2018-01-12 DIAGNOSIS — F33.40 RECURRENT MAJOR DEPRESSIVE DISORDER, IN REMISSION (HCC): ICD-10-CM

## 2018-01-12 PROCEDURE — 90834 PSYTX W PT 45 MINUTES: CPT | Performed by: SOCIAL WORKER

## 2018-01-13 NOTE — BH THERAPY
" [facility]  Therapy Progress Note    Patient Name: Isaiah Coronel  Patient MRN: [id number]  Today's Date: 1/12/2018     Type of session:Individual psychotherapy  Length of session: 45 minutes  Persons in attendance:Patient    Subjective/New Info: Patient present with report of feeling good about how he has been handling what would have previously been deemed stressful situations.  States he is not bothered by situations that he had gotten upset by in the past.  Notes two incidents where he practiced \"being in the moment\" and was able to calm self and regulate his emotional response.  He does inform of one incident where he did feel his feeling begin to escalate and he noted negative self talk though was still able to regain his composure.  Reports mood is significantly improved and affect and demeanor support claim.      Objective/Observations:   Participation: Active verbal participation, Attentive, Engaged and Open to feedback   Grooming: Casual   Cognition: Alert and Fully Oriented   Eye contact: Good   Mood: Happy   Affect: Full range   Thought process: Logical and Goal-directed   Speech: Rate within normal limits and Volume within normal limits   Other:     Diagnoses:   1. Recurrent major depressive disorder, in remission (CMS-[facility])         Current risk:   SUICIDE: Not applicable   Homicide: Not applicable   Self-harm: Not applicable   Relapse: Not applicable   Other:    Safety Plan reviewed? Not Indicated   If evidence of imminent risk is present, intervention/plan:     Therapeutic Intervention(s): Cognitive modification, Conflict resolution skills and Positive behavior reinforced    Treatment Goal(s)/Objective(s) addressed: : Reviewed patient commitment to change specific behaviors that have been identified by self or partner as problematic.      Progress toward Treatment Goals: Moderate improvement    Plan:  Patient will assess strengths in that could be enhanced during therapy to facilitate the " accomplishment of therapeutic goals.     - Next appointment scheduled:  1/24/2018  - Patient is in agreement with the above plan:  YES    Corinne G. Taylor, L.C.S.W.  1/12/2018

## 2018-01-19 ENCOUNTER — ANTICOAGULATION VISIT (OUTPATIENT)
Dept: MEDICAL GROUP | Facility: PHYSICIAN GROUP | Age: 78
End: 2018-01-19
Payer: MEDICARE

## 2018-01-19 VITALS — SYSTOLIC BLOOD PRESSURE: 154 MMHG | DIASTOLIC BLOOD PRESSURE: 96 MMHG | HEART RATE: 45 BPM

## 2018-01-19 DIAGNOSIS — I48.20 CHRONIC ATRIAL FIBRILLATION (HCC): ICD-10-CM

## 2018-01-19 LAB — INR PPP: 2 (ref 2–3.5)

## 2018-01-19 PROCEDURE — 85610 PROTHROMBIN TIME: CPT | Performed by: FAMILY MEDICINE

## 2018-01-19 PROCEDURE — 99999 PR NO CHARGE: CPT | Performed by: FAMILY MEDICINE

## 2018-01-19 NOTE — PROGRESS NOTES
Anticoagulation Summary  As of 1/19/2018    INR goal:   2.0-3.0   TTR:   60.2 % (2.6 y)   Today's INR:   2.0   Maintenance plan:   5 mg (5 mg x 1) on Mon, Wed, Fri; 7.5 mg (5 mg x 1.5) all other days   Weekly total:   45 mg   Plan last modified:   Kelvin Guy, PharmD (6/16/2017)   Next INR check:   3/9/2018   Target end date:   Indefinite    Indications    A-fib- chronic warfarin rx (Resolved) [I48.91]  Chronic atrial fibrillation (CMS-[facility])- DR PUGH; normal ECHO 2016  EF= 60 %  DR PUGH  [facility]; NORMAL CARDIAC CATH 2014 [facility] [I48.2]             Anticoagulation Episode Summary     INR check location:   [facility]    Preferred lab:       Send INR reminders to:       Comments:         Anticoagulation Care Providers     Provider Role Specialty Phone number    Duncan Tierney M.D. [facility] [phone]    Alonso Pugh M.D.  Cardiology [phone]    Mikhail Camacho, PharmD Responsible          Anticoagulation Patient Findings  Patient Findings     Negatives:   Signs/symptoms of thrombosis, Signs/symptoms of bleeding, Laboratory test error suspected, Change in health, Change in alcohol use, Change in activity, Upcoming invasive procedure, Emergency department visit, Upcoming dental procedure, Missed doses, Extra doses, Change in medications, Change in diet/appetite, Hospital admission, Bruising, Other complaints        HPI:   Isaiah Coronel seen in clinic today, on anticoagulation therapy with warfarin for stroke prevention due to history of chronic atrial fibrillation.    Patient's previous INR was therapeutic at 2.6 on 11-27-17, at which time patient was instructed to continue with current warfarin regimen.  He returns to clinic today to recheck INR to ensure it is therapeutic and thus preventing possible clotting and/or bleeding/bruising complications.    CHADS-VASc = at least 3  (unadjusted ischemic stroke risk/year:  3.2%, which is moderate risk)    Does patient have any changes to current  "medical/health status since last appt (Y/N):  Colonoscopy on 1-8-18, several polyps removed at that time, may have surgery in future to address findings  Does patient have any signs/symptoms of bleeding and/or thrombosis since the last appt (Y/N):  NO  Does patient have any interval changes to diet or medications since last appt (Y/N):  NO  Are there any complications or cost restrictions with current therapy (Y/N):  NO      Vitals:  /96  HR 45  recheck 152/94 HR 54     Weight  Declines    Height   6' 0\"     Asssessment:      INR remains therapeutic at 2.0, therefore decreasing patient's risk of stroke and/or bleeding complications.   Reason(s) for out of range INR today:  n/a      Plan:  Pt is to continue with current warfarin dosing regimen as this dose has maintained INR in therapeutic range for some time.     Follow up:  Because warfarin is a high risk medication and current CHEST guidelines recommend regular monitoring intervals (few days up to 12 weeks), will have patient return to clinic in 8 weeks to recheck INR.    Kelvin Guy, PharmD    "

## 2018-01-24 ENCOUNTER — OFFICE VISIT (OUTPATIENT)
Dept: BEHAVIORAL HEALTH | Facility: PHYSICIAN GROUP | Age: 78
End: 2018-01-24
Payer: MEDICARE

## 2018-01-24 DIAGNOSIS — F33.9 RECURRENT MAJOR DEPRESSIVE DISORDER, REMISSION STATUS UNSPECIFIED (HCC): ICD-10-CM

## 2018-01-24 PROCEDURE — 90834 PSYTX W PT 45 MINUTES: CPT | Performed by: SOCIAL WORKER
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
## 2018-01-25 ENCOUNTER — HOSPITAL ENCOUNTER (OUTPATIENT)
Dept: LAB | Facility: MEDICAL CENTER | Age: 78
End: 2018-01-25
Attending: SURGERY
Payer: MEDICARE

## 2018-01-25 LAB
ALBUMIN SERPL BCP-MCNC: 3.5 G/DL (ref 3.2–4.9)
ALBUMIN/GLOB SERPL: 1.1 G/DL
ALP SERPL-CCNC: 68 U/L (ref 30–99)
ALT SERPL-CCNC: 13 U/L (ref 2–50)
ANION GAP SERPL CALC-SCNC: 7 MMOL/L (ref 0–11.9)
AST SERPL-CCNC: 18 U/L (ref 12–45)
BASOPHILS # BLD AUTO: 0.6 % (ref 0–1.8)
BASOPHILS # BLD: 0.04 K/UL (ref 0–0.12)
BILIRUB SERPL-MCNC: 1.3 MG/DL (ref 0.1–1.5)
BUN SERPL-MCNC: 21 MG/DL (ref 8–22)
CALCIUM SERPL-MCNC: 9.5 MG/DL (ref 8.5–10.5)
CEA SERPL-MCNC: 1.9 NG/ML (ref 0–3)
CHLORIDE SERPL-SCNC: 105 MMOL/L (ref 96–112)
CO2 SERPL-SCNC: 29 MMOL/L (ref 20–33)
CREAT SERPL-MCNC: 0.91 MG/DL (ref 0.5–1.4)
EOSINOPHIL # BLD AUTO: 0.09 K/UL (ref 0–0.51)
EOSINOPHIL NFR BLD: 1.3 % (ref 0–6.9)
ERYTHROCYTE [DISTWIDTH] IN BLOOD BY AUTOMATED COUNT: 50 FL (ref 35.9–50)
GLOBULIN SER CALC-MCNC: 3.2 G/DL (ref 1.9–3.5)
GLUCOSE SERPL-MCNC: 48 MG/DL (ref 65–99)
HCT VFR BLD AUTO: 49.9 % (ref 42–52)
HGB BLD-MCNC: 15.9 G/DL (ref 14–18)
IMM GRANULOCYTES # BLD AUTO: 0.02 K/UL (ref 0–0.11)
IMM GRANULOCYTES NFR BLD AUTO: 0.3 % (ref 0–0.9)
LYMPHOCYTES # BLD AUTO: 1.4 K/UL (ref 1–4.8)
LYMPHOCYTES NFR BLD: 20.1 % (ref 22–41)
MCH RBC QN AUTO: 32.2 PG (ref 27–33)
MCHC RBC AUTO-ENTMCNC: 31.9 G/DL (ref 33.7–35.3)
MCV RBC AUTO: 101 FL (ref 81.4–97.8)
MONOCYTES # BLD AUTO: 0.58 K/UL (ref 0–0.85)
MONOCYTES NFR BLD AUTO: 8.3 % (ref 0–13.4)
NEUTROPHILS # BLD AUTO: 4.83 K/UL (ref 1.82–7.42)
NEUTROPHILS NFR BLD: 69.4 % (ref 44–72)
NRBC # BLD AUTO: 0 K/UL
NRBC BLD-RTO: 0 /100 WBC
PLATELET # BLD AUTO: 171 K/UL (ref 164–446)
PMV BLD AUTO: 11.2 FL (ref 9–12.9)
POTASSIUM SERPL-SCNC: 3.7 MMOL/L (ref 3.6–5.5)
PROT SERPL-MCNC: 6.7 G/DL (ref 6–8.2)
RBC # BLD AUTO: 4.94 M/UL (ref 4.7–6.1)
SODIUM SERPL-SCNC: 141 MMOL/L (ref 135–145)
WBC # BLD AUTO: 7 K/UL (ref 4.8–10.8)

## 2018-01-25 PROCEDURE — 85025 COMPLETE CBC W/AUTO DIFF WBC: CPT

## 2018-01-25 PROCEDURE — 80053 COMPREHEN METABOLIC PANEL: CPT

## 2018-01-25 PROCEDURE — 36415 COLL VENOUS BLD VENIPUNCTURE: CPT

## 2018-01-25 PROCEDURE — 82378 CARCINOEMBRYONIC ANTIGEN: CPT

## 2018-01-31 ENCOUNTER — HOSPITAL ENCOUNTER (OUTPATIENT)
Dept: RADIOLOGY | Facility: MEDICAL CENTER | Age: 78
End: 2018-01-31
Attending: SURGERY
Payer: MEDICARE

## 2018-01-31 DIAGNOSIS — D37.4 VILLOUS ADENOMA OF COLON: ICD-10-CM

## 2018-01-31 PROCEDURE — 74177 CT ABD & PELVIS W/CONTRAST: CPT

## 2018-01-31 PROCEDURE — 700117 HCHG RX CONTRAST REV CODE 255: Performed by: SURGERY

## 2018-01-31 RX ADMIN — IOHEXOL 100 ML: 350 INJECTION, SOLUTION INTRAVENOUS at 11:00

## 2018-01-31 RX ADMIN — IOHEXOL 50 ML: 240 INJECTION, SOLUTION INTRATHECAL; INTRAVASCULAR; INTRAVENOUS; ORAL at 11:00

## 2018-02-01 ENCOUNTER — OFFICE VISIT (OUTPATIENT)
Dept: BEHAVIORAL HEALTH | Facility: PHYSICIAN GROUP | Age: 78
End: 2018-02-01
Payer: MEDICARE

## 2018-02-01 DIAGNOSIS — F33.40 RECURRENT MAJOR DEPRESSIVE DISORDER, IN REMISSION (HCC): ICD-10-CM

## 2018-02-01 PROCEDURE — 90834 PSYTX W PT 45 MINUTES: CPT | Performed by: SOCIAL WORKER

## 2018-02-02 NOTE — BH THERAPY
[facility]  Therapy Progress Note    Patient Name: Isaiah Coronel  Patient MRN: [id number]  Today's Date: 2/2/2018     Type of session:Individual psychotherapy  Length of session: 45 minutes  Persons in attendance:Patient    Subjective/New Info: Met with patient who reports continued progress related to catching his negative self-talk, challenging thoughts and replacing with positive realistic, and empowering self-talk.  Discussed his pending medical issues and he reports feeling ready to get the surgery completed.  Says he and wife are optimistic and grateful he is able to get the surgery so soon.  Discussed his writing and patient's affect is bright and animated while describing writing classes he has taken and his plans to alter and complete a novel he has been working on about his high school reunion where he was reunited with an ex girlfriend.  Discussed overall improvement in mood and his thoughts about the progress he has made with increased awareness and using mindfulness.      Objective/Observations:   Participation: Active verbal participation, Attentive, Engaged and Open to feedback   Grooming: Casual and Neat   Cognition: Alert and Fully Oriented   Eye contact: Good   Mood: Euthymic   Affect: Full range   Thought process: Logical and Goal-directed   Speech: Rate within normal limits and Volume within normal limits   Other:     Diagnoses:   1. Recurrent major depressive disorder, in remission (CMS-[facility])         Current risk:   SUICIDE: Not applicable   Homicide: Not applicable   Self-harm: Not applicable   Relapse: Not applicable   Other:    Safety Plan reviewed? Not Indicated   If evidence of imminent risk is present, intervention/plan:     Therapeutic Intervention(s): Cognitive modification and Positive behavior reinforced    Treatment Goal(s)/Objective(s) addressed: Psychoeducation and practice on utilization of mindfulness meditation to improve emotional regulation, cognition, and distress  tolerance           Progress toward Treatment Goals: Significant improvement    Plan:  Assist client with identifying, challenging and replacing biased, fearful self-talk with positive, realistic, and empowering self-talk.  - Next appointment scheduled:  Visit date not found  - Patient is in agreement with the above plan:  YES    Corinne G. Taylor, L.C.S.W.  2/2/2018

## 2023-01-31 NOTE — PROGRESS NOTES
[facility] Anticoagulation Service Note    Date: 1/18/2017    Anticoagulation Summary as of 1/18/2017     INR goal 2.0-3.0   Selected INR 3.2! (1/18/2017)   Maintenance plan 5 mg (5 mg x 1) on Tue, Thu; 7.5 mg (5 mg x 1.5) all other days   Weekly total 47.5 mg   Plan last modified OSCAR Villalta (6/23/2016)   Next INR check 1/27/2017   Target end date Indefinite    Indications   A-fib- chronic warfarin rx (Resolved) [I48.91]  Atrial fibrillation (HCC)- dr khan [I48.91]         Anticoagulation Episode Summary     INR check location [facility]    Preferred lab     Send INR reminders to     Comments       Anticoagulation Care Providers     Provider Role Specialty Phone number    Duncan Tierney M.D. [facility] [phone]    Alonso Khan M.D.  Cardiology [phone]    Mikhail Camacho, PHARMD Responsible          Plan:  INR is high today. Confirmed dosing regimen. No missed or extra doses taken. Patient denies sign/symptoms of bleeding/clotting. He is currently on doxycycline, will finish today.  Patient has been eating a consistent diet. Instructed pt to call clinic with any concerns of bleeding or thrombosis. Pt has a GI procedure on 1-, he will need to stop his warfarin x 5 days before, CHADS-VASC = 3, no bridging necessary.  Pt is to take 5 mg tonight then resume usual regimen, stop warfarin 1-20-17, ok to restart 1- if ok with provider doing procedure. Follow up in 1 week prior to colonoscospy for INR check      Amaya Garland, Pharm D        
no

## (undated) DEVICE — SPONGE GAUZE NON-STERILE 4X4 - (2000/CA 10PK/CA)

## (undated) DEVICE — FORCEP RADIAL JAW 4 STANDARD CAPACITY W/NEEDLE 240CM (40EA/BX)

## (undated) DEVICE — ELECTRODE DUAL RETURN W/ CORD - (50/PK)

## (undated) DEVICE — MASK ANESTHESIA ADULT  - (100/CA)

## (undated) DEVICE — SYRINGE DISP. 50CC LS - (40/BX)

## (undated) DEVICE — TUBE SUCTION YANKAUER  1/4 X 6FT (20EA/CA)"

## (undated) DEVICE — SITE INJ 7/8IN IV M LL ADPR - (100/CA) THIS IS A CUSTOM ITEM AND HAS A 30 DAY LEAD TIME

## (undated) DEVICE — BITE BLOCK ADULT 60FR (100EA/CA)

## (undated) DEVICE — ELECTRODE 850 FOAM ADHESIVE - HYDROGEL RADIOTRNSPRNT (50/PK)

## (undated) DEVICE — SET EXTENSION WITH 2 PORTS (48EA/CA) ***PART #2C8610 IS A SUBSTITUTE*****

## (undated) DEVICE — SYRINGE SAFETY 10 ML 18 GA X 1 1/2 BLUNT LL (100/BX 4BX/CA)

## (undated) DEVICE — TUBE CONNECTING SUCTION - CLEAR PLASTIC STERILE 72 IN (50EA/CA)

## (undated) DEVICE — EXTRACTOR PRO XL 9-12 MM ABOVE

## (undated) DEVICE — TUBE SHILEY ENDOTRACHEAL ORAL RAE CUFFED 8.0MM WITH TAPERGUARD (10EA/BX)

## (undated) DEVICE — CATHETER IV SAFETY 24 GA X 3/4 (50EA/BX)

## (undated) DEVICE — GLOVE, LITE (PAIR)

## (undated) DEVICE — SYRINGE SAFETY 3 ML 18 GA X 1 1/2 BLUNT LL (100/BX 8BX/CA)

## (undated) DEVICE — SYRINGE SAFETY 5 ML 18 GA X 1-1/2 BLUNT LL (100/BX 4BX/CA)

## (undated) DEVICE — BASIN EMESIS DISP. - (250/CA)

## (undated) DEVICE — CANNULA W/ SUPPLY TUBING O2 - (50/CA)

## (undated) DEVICE — KIT  I.V. START (100EA/CA)

## (undated) DEVICE — GOWN SURGEONS LARGE - (32/CA)

## (undated) DEVICE — MASK WITH FACE SHIELD (25/BX 4BX/CA)

## (undated) DEVICE — TUBING CLEARLINK DUO-VENT - C-FLO (48EA/CA)

## (undated) DEVICE — LACTATED RINGERS INJ 1000 ML - (14EA/CA 60CA/PF)

## (undated) DEVICE — SPHINCTERTOME TRUETOME 44 20MM PRELOAD JAG 035IN

## (undated) DEVICE — KIT ANESTHESIA W/CIRCUIT & 3/LT BAG W/FILTER (20EA/CA)

## (undated) DEVICE — SENSOR SPO2 ADULT LNCS ADTX (20/BX) ORDER ITEM #19593